# Patient Record
Sex: MALE | Race: WHITE | NOT HISPANIC OR LATINO | Employment: FULL TIME | URBAN - METROPOLITAN AREA
[De-identification: names, ages, dates, MRNs, and addresses within clinical notes are randomized per-mention and may not be internally consistent; named-entity substitution may affect disease eponyms.]

---

## 2018-04-06 PROCEDURE — 93005 ELECTROCARDIOGRAM TRACING: CPT

## 2018-04-07 ENCOUNTER — APPOINTMENT (EMERGENCY)
Dept: CT IMAGING | Facility: HOSPITAL | Age: 43
DRG: 392 | End: 2018-04-07
Payer: COMMERCIAL

## 2018-04-07 ENCOUNTER — HOSPITAL ENCOUNTER (INPATIENT)
Facility: HOSPITAL | Age: 43
LOS: 1 days | Discharge: HOME | DRG: 392 | End: 2018-04-08
Attending: EMERGENCY MEDICINE | Admitting: SURGERY
Payer: COMMERCIAL

## 2018-04-07 DIAGNOSIS — K56.600 PARTIAL SMALL BOWEL OBSTRUCTION (HCC): Primary | ICD-10-CM

## 2018-04-07 PROBLEM — K52.9 ENTEROCOLITIS: Status: ACTIVE | Noted: 2018-04-07

## 2018-04-07 LAB
ALBUMIN SERPL BCP-MCNC: 4.6 G/DL (ref 3.5–5)
ALP SERPL-CCNC: 95 U/L (ref 46–116)
ALT SERPL W P-5'-P-CCNC: 107 U/L (ref 12–78)
ANION GAP SERPL CALCULATED.3IONS-SCNC: 9 MMOL/L (ref 4–13)
AST SERPL W P-5'-P-CCNC: 40 U/L (ref 5–45)
ATRIAL RATE: 130 BPM
BACTERIA UR QL AUTO: NORMAL /HPF
BASOPHILS # BLD AUTO: 0.03 THOUSANDS/ΜL (ref 0–0.1)
BASOPHILS NFR BLD AUTO: 0 % (ref 0–1)
BILIRUB SERPL-MCNC: 0.8 MG/DL (ref 0.2–1)
BILIRUB UR QL STRIP: NEGATIVE
BUN SERPL-MCNC: 16 MG/DL (ref 5–25)
CALCIUM SERPL-MCNC: 10.1 MG/DL (ref 8.3–10.1)
CHLORIDE SERPL-SCNC: 103 MMOL/L (ref 100–108)
CLARITY UR: CLEAR
CO2 SERPL-SCNC: 30 MMOL/L (ref 21–32)
COLOR UR: YELLOW
CREAT SERPL-MCNC: 1.41 MG/DL (ref 0.6–1.3)
EOSINOPHIL # BLD AUTO: 0.08 THOUSAND/ΜL (ref 0–0.61)
EOSINOPHIL NFR BLD AUTO: 1 % (ref 0–6)
ERYTHROCYTE [DISTWIDTH] IN BLOOD BY AUTOMATED COUNT: 12.9 % (ref 11.6–15.1)
GFR SERPL CREATININE-BSD FRML MDRD: 61 ML/MIN/1.73SQ M
GLUCOSE SERPL-MCNC: 126 MG/DL (ref 65–140)
GLUCOSE UR STRIP-MCNC: NEGATIVE MG/DL
HCT VFR BLD AUTO: 49.5 % (ref 36.5–49.3)
HGB BLD-MCNC: 17.2 G/DL (ref 12–17)
HGB UR QL STRIP.AUTO: NEGATIVE
KETONES UR STRIP-MCNC: NEGATIVE MG/DL
LEUKOCYTE ESTERASE UR QL STRIP: NEGATIVE
LIPASE SERPL-CCNC: 114 U/L (ref 73–393)
LYMPHOCYTES # BLD AUTO: 1.21 THOUSANDS/ΜL (ref 0.6–4.47)
LYMPHOCYTES NFR BLD AUTO: 11 % (ref 14–44)
MAGNESIUM SERPL-MCNC: 2.3 MG/DL (ref 1.6–2.6)
MCH RBC QN AUTO: 29.9 PG (ref 26.8–34.3)
MCHC RBC AUTO-ENTMCNC: 34.7 G/DL (ref 31.4–37.4)
MCV RBC AUTO: 86 FL (ref 82–98)
MONOCYTES # BLD AUTO: 0.65 THOUSAND/ΜL (ref 0.17–1.22)
MONOCYTES NFR BLD AUTO: 6 % (ref 4–12)
MUCOUS THREADS UR QL AUTO: NORMAL
NEUTROPHILS # BLD AUTO: 9.25 THOUSANDS/ΜL (ref 1.85–7.62)
NEUTS SEG NFR BLD AUTO: 82 % (ref 43–75)
NITRITE UR QL STRIP: NEGATIVE
NON-SQ EPI CELLS URNS QL MICRO: NORMAL /HPF
P AXIS: 37 DEGREES
PH UR STRIP.AUTO: 6.5 [PH] (ref 4.5–8)
PLATELET # BLD AUTO: 192 THOUSANDS/UL (ref 149–390)
PMV BLD AUTO: 11 FL (ref 8.9–12.7)
POTASSIUM SERPL-SCNC: 4 MMOL/L (ref 3.5–5.3)
PR INTERVAL: 164 MS
PROT SERPL-MCNC: 8 G/DL (ref 6.4–8.2)
PROT UR STRIP-MCNC: ABNORMAL MG/DL
QRS AXIS: 32 DEGREES
QRSD INTERVAL: 92 MS
QT INTERVAL: 304 MS
QTC INTERVAL: 439 MS
RBC # BLD AUTO: 5.76 MILLION/UL (ref 3.88–5.62)
RBC #/AREA URNS AUTO: NORMAL /HPF
SODIUM SERPL-SCNC: 142 MMOL/L (ref 136–145)
SP GR UR STRIP.AUTO: 1.02 (ref 1–1.03)
T WAVE AXIS: 16 DEGREES
UROBILINOGEN UR QL STRIP.AUTO: 0.2 E.U./DL
VENTRICULAR RATE: 125 BPM
WBC # BLD AUTO: 11.22 THOUSAND/UL (ref 4.31–10.16)
WBC #/AREA URNS AUTO: NORMAL /HPF

## 2018-04-07 PROCEDURE — 83735 ASSAY OF MAGNESIUM: CPT | Performed by: EMERGENCY MEDICINE

## 2018-04-07 PROCEDURE — 96374 THER/PROPH/DIAG INJ IV PUSH: CPT

## 2018-04-07 PROCEDURE — 36415 COLL VENOUS BLD VENIPUNCTURE: CPT | Performed by: EMERGENCY MEDICINE

## 2018-04-07 PROCEDURE — 85025 COMPLETE CBC W/AUTO DIFF WBC: CPT | Performed by: EMERGENCY MEDICINE

## 2018-04-07 PROCEDURE — 81001 URINALYSIS AUTO W/SCOPE: CPT | Performed by: EMERGENCY MEDICINE

## 2018-04-07 PROCEDURE — 80053 COMPREHEN METABOLIC PANEL: CPT | Performed by: EMERGENCY MEDICINE

## 2018-04-07 PROCEDURE — 99253 IP/OBS CNSLTJ NEW/EST LOW 45: CPT | Performed by: INTERNAL MEDICINE

## 2018-04-07 PROCEDURE — 83690 ASSAY OF LIPASE: CPT | Performed by: EMERGENCY MEDICINE

## 2018-04-07 PROCEDURE — 99285 EMERGENCY DEPT VISIT HI MDM: CPT

## 2018-04-07 PROCEDURE — 93010 ELECTROCARDIOGRAM REPORT: CPT | Performed by: INTERNAL MEDICINE

## 2018-04-07 PROCEDURE — C9113 INJ PANTOPRAZOLE SODIUM, VIA: HCPCS | Performed by: SURGERY

## 2018-04-07 PROCEDURE — 87631 RESP VIRUS 3-5 TARGETS: CPT | Performed by: SURGERY

## 2018-04-07 PROCEDURE — 74176 CT ABD & PELVIS W/O CONTRAST: CPT

## 2018-04-07 PROCEDURE — 96361 HYDRATE IV INFUSION ADD-ON: CPT

## 2018-04-07 RX ORDER — PANTOPRAZOLE SODIUM 40 MG/1
40 INJECTION, POWDER, FOR SOLUTION INTRAVENOUS
Status: DISCONTINUED | OUTPATIENT
Start: 2018-04-07 | End: 2018-04-08 | Stop reason: HOSPADM

## 2018-04-07 RX ORDER — ONDANSETRON 2 MG/ML
4 INJECTION INTRAMUSCULAR; INTRAVENOUS ONCE
Status: COMPLETED | OUTPATIENT
Start: 2018-04-07 | End: 2018-04-07

## 2018-04-07 RX ORDER — HYDRALAZINE HYDROCHLORIDE 20 MG/ML
5 INJECTION INTRAMUSCULAR; INTRAVENOUS EVERY 4 HOURS PRN
Status: DISCONTINUED | OUTPATIENT
Start: 2018-04-07 | End: 2018-04-08 | Stop reason: HOSPADM

## 2018-04-07 RX ORDER — ACETAMINOPHEN 325 MG/1
650 TABLET ORAL EVERY 6 HOURS PRN
Status: DISCONTINUED | OUTPATIENT
Start: 2018-04-07 | End: 2018-04-08 | Stop reason: HOSPADM

## 2018-04-07 RX ORDER — DEXTROSE, SODIUM CHLORIDE, AND POTASSIUM CHLORIDE 5; .45; .15 G/100ML; G/100ML; G/100ML
125 INJECTION INTRAVENOUS CONTINUOUS
Status: DISCONTINUED | OUTPATIENT
Start: 2018-04-07 | End: 2018-04-08 | Stop reason: HOSPADM

## 2018-04-07 RX ORDER — ONDANSETRON 2 MG/ML
4 INJECTION INTRAMUSCULAR; INTRAVENOUS EVERY 4 HOURS PRN
Status: DISCONTINUED | OUTPATIENT
Start: 2018-04-07 | End: 2018-04-08 | Stop reason: HOSPADM

## 2018-04-07 RX ORDER — MONTELUKAST SODIUM 10 MG/1
10 TABLET ORAL
Status: DISCONTINUED | OUTPATIENT
Start: 2018-04-07 | End: 2018-04-08 | Stop reason: HOSPADM

## 2018-04-07 RX ORDER — ALBUTEROL SULFATE 90 UG/1
1-2 AEROSOL, METERED RESPIRATORY (INHALATION)
COMMUNITY

## 2018-04-07 RX ADMIN — PANTOPRAZOLE SODIUM 40 MG: 40 INJECTION, POWDER, FOR SOLUTION INTRAVENOUS at 08:17

## 2018-04-07 RX ADMIN — DEXTROSE, SODIUM CHLORIDE, AND POTASSIUM CHLORIDE 125 ML/HR: 5; .45; .15 INJECTION INTRAVENOUS at 05:48

## 2018-04-07 RX ADMIN — DEXTROSE, SODIUM CHLORIDE, AND POTASSIUM CHLORIDE 125 ML/HR: 5; .45; .15 INJECTION INTRAVENOUS at 22:04

## 2018-04-07 RX ADMIN — SODIUM CHLORIDE 1000 ML: 0.9 INJECTION, SOLUTION INTRAVENOUS at 00:34

## 2018-04-07 RX ADMIN — ONDANSETRON 4 MG: 2 INJECTION INTRAMUSCULAR; INTRAVENOUS at 00:31

## 2018-04-07 RX ADMIN — DEXTROSE, SODIUM CHLORIDE, AND POTASSIUM CHLORIDE 125 ML/HR: 5; .45; .15 INJECTION INTRAVENOUS at 14:40

## 2018-04-07 RX ADMIN — ENOXAPARIN SODIUM 40 MG: 40 INJECTION SUBCUTANEOUS at 08:16

## 2018-04-07 RX ADMIN — SODIUM CHLORIDE 1000 ML: 0.9 INJECTION, SOLUTION INTRAVENOUS at 03:47

## 2018-04-07 RX ADMIN — MONTELUKAST SODIUM 10 MG: 10 TABLET, COATED ORAL at 22:04

## 2018-04-07 RX ADMIN — ACETAMINOPHEN 650 MG: 325 TABLET, FILM COATED ORAL at 16:05

## 2018-04-07 NOTE — PLAN OF CARE
DISCHARGE PLANNING     Discharge to home or other facility with appropriate resources Progressing        GASTROINTESTINAL - ADULT     Minimal or absence of nausea and/or vomiting Progressing     Maintains or returns to baseline bowel function Progressing     Maintains adequate nutritional intake Progressing        INFECTION - ADULT     Absence or prevention of progression during hospitalization Progressing        Knowledge Deficit     Patient/family/caregiver demonstrates understanding of disease process, treatment plan, medications, and discharge instructions Progressing        PAIN - ADULT     Verbalizes/displays adequate comfort level or baseline comfort level Progressing

## 2018-04-07 NOTE — CASE MANAGEMENT
Initial Clinical Review    Admission: Date/Time/Statement: 4/7/18 @ 0331     Orders Placed This Encounter   Procedures    Inpatient Admission (expected length of stay for this patient is greater than two midnights)     Standing Status:   Standing     Number of Occurrences:   1     Order Specific Question:   Admitting Physician     Answer:   Randi Bose [[id number]]     Order Specific Question:   Level of Care     Answer:   Med Surg [[id number]]     Order Specific Question:   Estimated length of stay     Answer:   More than 2 Midnights     Order Specific Question:   Certification     Answer:   I certify that inpatient services are medically necessary for this patient for a duration of greater than two midnights  See H&P and MD Progress Notes for additional information about the patient's course of treatment  ED: Date/Time/Mode of Arrival:   ED Arrival Information     Expected Arrival Acuity Means of Arrival Escorted By Service Admission Type    - 4/6/2018 22:14 Emergent Walk-In Self General Medicine Emergency    Arrival Complaint    stomach pain          Chief Complaint:   Chief Complaint   Patient presents with    Abdominal Pain     C/o of generalized abd pain onset this afternoon followed by nausea and dry heaving  Denies diarrhea  History of Illness: This 68-year-old male presents today with lower abdominal pain  Patient states symptoms began earlier this evening  Patient describes it as a dull aching pain across the entire lower portion of his abdomen with periodic exacerbations of ache more cramping type severe pain  Patient denies history of similar symptoms in the past   Patient's last bowel movement was at 6:00 p m , during the pain  Patient states that this did not make the pain any better or worse  Patient denies any fevers or chills  Patient does relay episode of vomiting and several episodes of dry heaving  Patient denies any recent sick contacts, travel, unusual food intake    Patient does not take any medications regularly and only past medical history is asthma  Patient denies any urinary complaints  ED Vital Signs:   ED Triage Vitals [04/06/18 2317]   Temperature Pulse Respirations Blood Pressure SpO2   98 6 °F (37 °C) (!) 120 18 (!) 180/110 94 %      Temp Source Heart Rate Source Patient Position - Orthostatic VS BP Location FiO2 (%)   Oral Monitor Sitting Right arm --      Pain Score       6        Wt Readings from Last 1 Encounters:   04/07/18 118 kg (260 lb 2 3 oz)       Vital Signs (abnormal):   Date and Time Temp Pulse SpO2 Resp BP   04/07/18 0400 -- 100 96 % 18 137/92   04/07/18 0305 --  106 96 % 18  168/105   04/07/18 0143 --  113 94 % 18  161/105   04/07/18 0032 --  120 94 % 18  162/108       Abnormal Labs/Diagnostic Test Results: Creat 1 41, , WBC 11 22, H/H 17 2/49 5, Neutros PCT 82    CT of Abd/Pelvis:   Impression:    1   Mild abnormal appearance of the small and large bowel, suggesting mild enterocolitis, either infectious or inflammatory  2   Hiatal hernia with thickening of the distal esophagus, likely related to reflux esophagitis   Consider follow-up upper endoscopy  3   Mild abnormal appearance of the duodenum, most compatible with duodenitis   Consider follow-up upper endoscopy  4   Mild submucosal fat replacement involving the small and large bowel, likely related to either obesity or chronic inflammatory bowel disease           EKG: Sinus tachycardia     ED Treatment:   Medication Administration from 04/06/2018 2214 to 04/07/2018 0442       Date/Time Order Dose Route Action Action by Comments     04/07/2018 0031 ondansetron (ZOFRAN) injection 4 mg 4 mg Intravenous Given Roc Ambriz RN      04/07/2018 0143 sodium chloride 0 9 % bolus 1,000 mL 0 mL Intravenous Stopped Roc Ambriz RN      04/07/2018 0034 sodium chloride 0 9 % bolus 1,000 mL 1,000 mL Intravenous [address] Roc Ambriz RN      04/07/2018 0347 sodium chloride 0 9 % bolus 1,000 mL 1,000 mL Intravenous New Bag Goyo Bowie RN         Physical Exam   Constitutional: He is oriented to person, place, and time  He appears well-developed and well-nourished  He is cooperative  No distress  Cardiovascular: Regular rhythm, normal heart sounds and normal pulses  Tachycardia present  No murmur heard  Pulses:       Dorsalis pedis pulses are 2+ on the right side, and 2+ on the left side  Pulmonary/Chest: Effort normal and breath sounds normal  He exhibits no tenderness  Abdominal: Soft  He exhibits no distension  There is tenderness in the right lower quadrant, suprapubic area and left lower quadrant  There is no rebound and no guarding  Neurological: He is alert and oriented to person, place, and time  He has normal strength  No cranial nerve deficit or sensory deficit  Coordination normal  GCS eye subscore is 4  GCS verbal subscore is 5  GCS motor subscore is 6  Past Medical/Surgical History:    Active Ambulatory Problems     Diagnosis Date Noted    No Active Ambulatory Problems     Resolved Ambulatory Problems     Diagnosis Date Noted    No Resolved Ambulatory Problems     Past Medical History:   Diagnosis Date    Asthma        Admitting Diagnosis: Stomach pain [R10 9]  Partial small bowel obstruction ([facility] [address] ) [K56 600]    Age/Sex: 41 y o  male    Assessment/Plan:     29-year-old male with possible enterocolitis  -will allow clears for breakfast  -will see how patient tolerates clears  -DVT prophylaxis  -nausea and pain control     Tachycardia  -currently heart rate of 92  -continue IV fluids     Admission Orders:  Inpatient/MedSurg  Consult Internal Medicine r/e partial bowel obstruction   Bilateral Sequential Compression Device  Clear Liquids  D5%NSS 0 45% + KCL 20 mEq @ 125    Scheduled Meds:   Current Facility-Administered Medications:  enoxaparin 40 mg Subcutaneous Q24H [facility]   influenza vaccine 0 5 mL Intramuscular Prior to discharge   montelukast 10 mg Oral HS   pantoprazole 40 mg Intravenous Q24H [address]

## 2018-04-07 NOTE — H&P
History and Physical -General Surgery  Orly Ortiz 41 y o  male MRN: [id number]  Unit/Bed#: -01 Encounter: [id number]        Reason for Consult / Principal Problem:  Abdominal pain  HPI: Orly Ortiz is a 41y o  year old male with no significant past medical history who presents with abdominal pain since last evening  Patient states around 6:00 p m  he began having diffuse abdominal pain that got worse overnight  States he ate dinner and vomited after  Did have a bowel movement last evening  Denies any abdominal surgical history  Was tachy overnight with max heart rate recorded at 120  Currently 92  CT abdomen pelvis revealed mild control colitis of small and large bowel, hiatal hernia,duodenitis  Currently states his abdominal pain is better  Denies any nausea or vomiting overnight  Review of Systems   Constitutional: Positive for appetite change  Negative for chills and fever  HENT: Negative for congestion and sore throat  Gastrointestinal: Positive for abdominal pain, nausea and vomiting  Negative for abdominal distention, constipation and diarrhea  Skin: Negative for rash and wound  Hematological: Does not bruise/bleed easily  Psychiatric/Behavioral: Negative for behavioral problems, confusion and decreased concentration  Historical Information   Past Medical History:   Diagnosis Date    Asthma      Past Surgical History:   Procedure Laterality Date    THUMB AMPUTATION Left 09/2015     Social History   History   Alcohol Use    Yes     Comment: occasionally     History   Drug Use No     History   Smoking Status    Never Smoker   Smokeless Tobacco    Never Used     History reviewed  No pertinent family history      Meds/Allergies     Prescriptions Prior to Admission   Medication    montelukast (SINGULAIR) 10 mg tablet    albuterol (VENTOLIN HFA) 90 mcg/act inhaler    Mometasone Furo-Formoterol Fum (DULERA) 200-5 MCG/ACT AERO     Current Facility-Administered Medications   Medication Dose Route Frequency    dextrose 5 % and sodium chloride 0 45 % with KCl 20 mEq/L infusion  125 mL/hr Intravenous Continuous    enoxaparin (LOVENOX) subcutaneous injection 40 mg  40 mg Subcutaneous Q24H JOANNE    hydrALAZINE (APRESOLINE) injection 5 mg  5 mg Intravenous Q4H PRN    HYDROmorphone (DILAUDID) injection 0 5 mg  0 5 mg Intravenous Q1H PRN    influenza inactivated quadrivalent vaccine (FLULAVAL) IM injection 0 5 mL  0 5 mL Intramuscular Prior to discharge    montelukast (SINGULAIR) tablet 10 mg  10 mg Oral HS    ondansetron (ZOFRAN) injection 4 mg  4 mg Intravenous Q4H PRN    pantoprazole (PROTONIX) injection 40 mg  40 mg Intravenous Q24H JOANNE       No Known Allergies    Objective     Blood pressure 144/99, pulse 93, temperature 97 8 °F (36 6 °C), temperature source Oral, resp  rate 18, height 6' (1 829 m), weight 118 kg (260 lb 2 3 oz), SpO2 94 %  Intake/Output Summary (Last 24 hours) at 04/07/18 3506  Last data filed at 04/07/18 0143   Gross per 24 hour   Intake             1000 ml   Output                0 ml   Net             1000 ml       PHYSICAL EXAM  General appearance: alert and oriented, in no acute distress  Skin: Skin color, texture, turgor normal  No rashes or lesions  Heart: regular rate and rhythm, S1, S2 normal, no murmur, click, rub or gallop  Lungs: clear to auscultation bilaterally  Abdomen: Soft, bowel sounds positive  Some tenderness to lower abdomen  No peritoneal signs    Neurological:  Speech normal, alert and oriented x3    Lab Results:   Admission on 04/07/2018   Component Date Value    WBC 04/07/2018 11 22*    RBC 04/07/2018 5 76*    Hemoglobin 04/07/2018 17 2*    Hematocrit 04/07/2018 49 5*    MCV 04/07/2018 86     MCH 04/07/2018 29 9     MCHC 04/07/2018 34 7     RDW 04/07/2018 12 9     MPV 04/07/2018 11 0     Platelets 83/21/5207 192     Neutrophils Relative 04/07/2018 82*    Lymphocytes Relative 04/07/2018 11*    Monocytes Relative 04/07/2018 6     Eosinophils Relative 04/07/2018 1     Basophils Relative 04/07/2018 0     Neutrophils Absolute 04/07/2018 9 25*    Lymphocytes Absolute 04/07/2018 1 21     Monocytes Absolute 04/07/2018 0 65     Eosinophils Absolute 04/07/2018 0 08     Basophils Absolute 04/07/2018 0 03     Sodium 04/07/2018 142     Potassium 04/07/2018 4 0     Chloride 04/07/2018 103     CO2 04/07/2018 30     Anion Gap 04/07/2018 9     BUN 04/07/2018 16     Creatinine 04/07/2018 1 41*    Glucose 04/07/2018 126     Calcium 04/07/2018 10 1     AST 04/07/2018 40     ALT 04/07/2018 107*    Alkaline Phosphatase 04/07/2018 95     Total Protein 04/07/2018 8 0     Albumin 04/07/2018 4 6     Total Bilirubin 04/07/2018 0 80     eGFR 04/07/2018 61     Lipase 04/07/2018 114     Magnesium 04/07/2018 2 3      Imaging Studies: I have personally reviewed pertinent reports  CT abd/pelvis w/o IV contrast 4/7/18:     CT ABDOMEN AND PELVIS WITHOUT IV CONTRAST     INDICATION:   lower abd pain, vomiting, tachycardia  Lower abdominal pain  Nausea and vomiting      COMPARISON: CT chest abdomen pelvis January 8, 2008     TECHNIQUE:  CT examination of the abdomen and pelvis was performed without intravenous contrast   Axial, sagittal, and coronal 2D reformatted images were created from the source data and submitted for interpretation       Radiation dose length product (DLP) for this visit:  6256 mGy-cm   This examination, like all CT scans performed in the [facility], was performed utilizing techniques to minimize radiation dose exposure, including the use of iterative   reconstruction and automated exposure control       Enteric contrast was not administered       FINDINGS:     ABDOMEN     LOWER CHEST:  No clinically significant abnormality identified in the visualized lower chest      LIVER/BILIARY TREE:  Enlarged with fatty infiltrative changes      GALLBLADDER:  No calcified gallstones   No pericholecystic inflammatory change      SPLEEN:  Unremarkable      PANCREAS:  Unremarkable      ADRENAL GLANDS:  Unremarkable      KIDNEYS/URETERS:  Unremarkable  No hydronephrosis      STOMACH AND BOWEL:    Evaluation limited due to lack of oral and intravenous contrast material      Stomach mildly distended and filled with recently ingested food products  There is a hiatal hernia  Thickening of the distal esophagus      The 3rd and 4th portions of the duodenum are mildly thickened and fluid-filled  Proximal small bowel mildly dilated and fluid-filled  Mid small bowel relatively decompressed  There is mild amount of submucosal fat in the mid and distal small bowel  The terminal ileum is distended and fluid-filled      The colon is mildly distended and filled with liquid feces  Scattered diverticula in the descending colon and sigmoid colon      APPENDIX:  No findings to suggest appendicitis      ABDOMINOPELVIC CAVITY:  No ascites or free intraperitoneal air  Mild amount of fluid in the small bowel mesentery  Small bowel mesentery is mildly edematous  No lymphadenopathy      VESSELS:  Unremarkable for patient's age      PELVIS     REPRODUCTIVE ORGANS:  Unremarkable for patient's age      URINARY BLADDER:  Unremarkable      ABDOMINAL WALL/INGUINAL REGIONS:  Unremarkable      OSSEOUS STRUCTURES:  No acute fracture or destructive osseous lesion      IMPRESSION:  1  Mild abnormal appearance of the small and large bowel, suggesting mild enterocolitis, either infectious or inflammatory  2   Hiatal hernia with thickening of the distal esophagus, likely related to reflux esophagitis  Consider follow-up upper endoscopy  3   Mild abnormal appearance of the duodenum, most compatible with duodenitis  Consider follow-up upper endoscopy    4   Mild submucosal fat replacement involving the small and large bowel, likely related to either obesity or chronic inflammatory bowel disease      The major findings are in agreement with the preliminary report provided by [facility] which was provided shortly after completion of the exam  The additional finding of  abnormal appearance of the esophagus and duodenum  Consider follow-up   upper endoscopy  The findings will now be communicated with patient's clinical team by our radiology liaison  ASSESSMENT/PLAN:    61-year-old male with possible enterocolitis  -will allow clears for breakfast  -will see how patient tolerates clears  -DVT prophylaxis  -nausea and pain control    Tachycardia  -currently heart rate of 92  -continue IV fluids        Counseling / Coordination of Care  Total time spent today  20 minutes  Greater than 50% of total time was spent with the patient and / or family counseling and / or coordination of care       Frank Hall PA-C

## 2018-04-07 NOTE — ED PROVIDER NOTES
History  Chief Complaint   Patient presents with    Abdominal Pain     C/o of generalized abd pain onset this afternoon followed by nausea and dry heaving  Denies diarrhea  This 61-year-old male presents today with lower abdominal pain  Patient states symptoms began earlier this evening  Patient describes it as a dull aching pain across the entire lower portion of his abdomen with periodic exacerbations of ache more cramping type severe pain  Patient denies history of similar symptoms in the past   Patient's last bowel movement was at 6:00 p m , during the pain  Patient states that this did not make the pain any better or worse  Patient denies any fevers or chills  Patient does relay episode of vomiting and several episodes of dry heaving  Patient denies any recent sick contacts, travel, unusual food intake  Patient does not take any medications regularly and only past medical history is asthma  Patient denies any urinary complaints  History provided by:  Patient   used: No    Abdominal Pain   Pain location:  LLQ, RLQ and suprapubic  Pain quality: aching and cramping    Pain radiates to:  Does not radiate  Pain severity:  Mild  Onset quality:  Gradual  Duration:  8 hours  Timing:  Constant  Progression:  Waxing and waning  Chronicity:  New  Context: not previous surgeries, not recent travel, not sick contacts and not suspicious food intake    Relieved by:  None tried  Worsened by:  Eating  Ineffective treatments:  None tried  Associated symptoms: nausea and vomiting    Associated symptoms: no chest pain, no constipation, no cough, no diarrhea, no dysuria, no fever, no hematuria, no shortness of breath and no sore throat        Prior to Admission Medications   Prescriptions Last Dose Informant Patient Reported? Taking? azithromycin (ZITHROMAX Z-SHAHIDA) 250 mg tablet   No Yes   Sig: Take 1 tablet (250 mg total) by mouth daily   Take two tabs on day one and then one tab each day for 4 days   montelukast (SINGULAIR) 10 mg tablet   Yes Yes   Sig: Take 10 mg by mouth daily at bedtime  Facility-Administered Medications: None       Past Medical History:   Diagnosis Date    Asthma        History reviewed  No pertinent surgical history  History reviewed  No pertinent family history  I have reviewed and agree with the history as documented  Social History   Substance Use Topics    Smoking status: Never Smoker    Smokeless tobacco: Never Used    Alcohol use Yes      Comment: occasionally        Review of Systems   Constitutional: Negative for activity change, appetite change, diaphoresis and fever  HENT: Negative for ear pain, facial swelling, sore throat, tinnitus and voice change  Eyes: Negative for photophobia, pain and redness  Respiratory: Negative for cough, chest tightness, shortness of breath and wheezing  Cardiovascular: Negative for chest pain, palpitations and leg swelling  Gastrointestinal: Positive for abdominal pain, nausea and vomiting  Negative for abdominal distention, constipation and diarrhea  Genitourinary: Negative for difficulty urinating, dysuria, flank pain, hematuria and urgency  Musculoskeletal: Negative for back pain, gait problem and neck pain  Skin: Negative for rash and wound  Neurological: Negative for dizziness, syncope, speech difficulty, weakness and headaches  Psychiatric/Behavioral: Negative for agitation, behavioral problems and confusion         Physical Exam  ED Triage Vitals [04/06/18 2317]   Temperature Pulse Respirations Blood Pressure SpO2   98 6 °F (37 °C) (!) 120 18 (!) 180/110 94 %      Temp Source Heart Rate Source Patient Position - Orthostatic VS BP Location FiO2 (%)   Oral Monitor Sitting Right arm --      Pain Score       6           Orthostatic Vital Signs  Vitals:    04/06/18 2317 04/07/18 0032 04/07/18 0143 04/07/18 0305   BP: (!) 180/110 (!) 162/108 (!) 161/105 (!) 168/105   Pulse: (!) 120 (!) 120 (!) 113 (!) 106   Patient Position - Orthostatic VS: Sitting Lying Lying Lying       Physical Exam   Constitutional: He is oriented to person, place, and time  He appears well-developed and well-nourished  He is cooperative  No distress  HENT:   Head: Normocephalic and atraumatic  Mouth/Throat: Oropharynx is clear and moist    Eyes: EOM and lids are normal  Pupils are equal, round, and reactive to light  Right eye exhibits no discharge  Left eye exhibits no discharge  Right conjunctiva is not injected  Left conjunctiva is not injected  Neck: Trachea normal, normal range of motion, full passive range of motion without pain and phonation normal  Neck supple  Cardiovascular: Regular rhythm, normal heart sounds and normal pulses  Tachycardia present  No murmur heard  Pulses:       Dorsalis pedis pulses are 2+ on the right side, and 2+ on the left side  Pulmonary/Chest: Effort normal and breath sounds normal  He exhibits no tenderness  Abdominal: Soft  He exhibits no distension  There is tenderness in the right lower quadrant, suprapubic area and left lower quadrant  There is no rebound and no guarding  Musculoskeletal: Normal range of motion  He exhibits no edema  Neurological: He is alert and oriented to person, place, and time  He has normal strength  No cranial nerve deficit or sensory deficit  Coordination normal  GCS eye subscore is 4  GCS verbal subscore is 5  GCS motor subscore is 6  Skin: Skin is warm, dry and intact  Capillary refill takes less than 2 seconds  No rash noted  Psychiatric: He has a normal mood and affect  His speech is normal and behavior is normal  Thought content normal    Vitals reviewed        ED Medications  Medications   sodium chloride 0 9 % bolus 1,000 mL (not administered)   ondansetron (ZOFRAN) injection 4 mg (4 mg Intravenous Given 4/7/18 0031)   sodium chloride 0 9 % bolus 1,000 mL (0 mL Intravenous Stopped 4/7/18 0143)       Diagnostic Studies  Results Reviewed Procedure Component Value Units Date/Time    CMP [[id number]]  (Abnormal) Collected:  04/07/18 0033    Lab Status:  Final result Specimen:  Blood from Arm, Left Updated:  04/07/18 0059     Sodium 142 mmol/L      Potassium 4 0 mmol/L      Chloride 103 mmol/L      CO2 30 mmol/L      Anion Gap 9 mmol/L      BUN 16 mg/dL      Creatinine 1 41 (H) mg/dL      Glucose 126 mg/dL      Calcium 10 1 mg/dL      AST 40 U/L       (H) U/L      Alkaline Phosphatase 95 U/L      Total Protein 8 0 g/dL      Albumin 4 6 g/dL      Total Bilirubin 0 80 mg/dL      eGFR 61 ml/min/1 73sq m     Narrative:         National Kidney Disease Education Program recommendations are as follows:  GFR calculation is accurate only with a steady state creatinine  Chronic Kidney disease less than 60 ml/min/1 73 sq  meters  Kidney failure less than 15 ml/min/1 73 sq  meters      Lipase [[id number]]  (Normal) Collected:  04/07/18 0033    Lab Status:  Final result Specimen:  Blood from Arm, Left Updated:  04/07/18 0059     Lipase 114 u/L     Magnesium [[id number]]  (Normal) Collected:  04/07/18 0033    Lab Status:  Final result Specimen:  Blood from Arm, Left Updated:  04/07/18 0059     Magnesium 2 3 mg/dL     CBC and differential [[id number]]  (Abnormal) Collected:  04/07/18 0033    Lab Status:  Final result Specimen:  Blood from Arm, Left Updated:  04/07/18 0042     WBC 11 22 (H) Thousand/uL      RBC 5 76 (H) Million/uL      Hemoglobin 17 2 (H) g/dL      Hematocrit 49 5 (H) %      MCV 86 fL      MCH 29 9 pg      MCHC 34 7 g/dL      RDW 12 9 %      MPV 11 0 fL      Platelets 806 Thousands/uL      Neutrophils Relative 82 (H) %      Lymphocytes Relative 11 (L) %      Monocytes Relative 6 %      Eosinophils Relative 1 %      Basophils Relative 0 %      Neutrophils Absolute 9 25 (H) Thousands/µL      Lymphocytes Absolute 1 21 Thousands/µL      Monocytes Absolute 0 65 Thousand/µL      Eosinophils Absolute 0 08 Thousand/µL      Basophils Absolute 0 03 Thousands/µL UA w Reflex to Microscopic w Reflex to Culture [[id number]]     Lab Status:  No result Specimen:  Urine                  CT abdomen pelvis wo contrast    (Results Pending)              Procedures  Procedures       Phone Contacts  ED Phone Contact    ED Course  ED Course                                MDM  Number of Diagnoses or Management Options  Partial small bowel obstruction [facility]):   Diagnosis management comments: After IV fluids patient continues to be tachycardic with a heart rate approximately 106  CT scan demonstrates evidence of a partial small bowel obstruction  Discussed case with General surgery, Dr Kam Bhatti who has accepted patient in admission  Amount and/or Complexity of Data Reviewed  Clinical lab tests: ordered and reviewed  Tests in the radiology section of CPT®: reviewed and ordered  Tests in the medicine section of CPT®: ordered and reviewed  Discuss the patient with other providers: yes    Risk of Complications, Morbidity, and/or Mortality  Presenting problems: high  Diagnostic procedures: high  Management options: high    Patient Progress  Patient progress: stable    CritCare Time    Disposition  Final diagnoses:   Partial small bowel obstruction ([address] )     Time reflects when diagnosis was documented in both MDM as applicable and the Disposition within this note     Time User Action Codes Description Comment    4/7/2018  3:31 AM Graciela Burleson Add [K56 430] Partial small bowel obstruction [facility])       ED Disposition     ED Disposition Condition Comment    Admit  Case was discussed with [facility] and the patient's admission status was agreed to be Admission Status: inpatient status to the service of Dr aKm Bhatti   Follow-up Information    None       Patient's Medications   Discharge Prescriptions    No medications on file     No discharge procedures on file      ED Provider  Electronically Signed by           Fahad Carreon MD  04/07/18 9825

## 2018-04-07 NOTE — CONSULTS
Inpatient Medical Consultation - [facility]    Patient Information: Sindy Ortiz 41 y o  male MRN: [id number]  Unit/Bed#: -01 Encounter: [id number]  PCP: Reed Dickerson MD  Date of Admission:  4/7/2018  Date of Consultation: 04/07/18  Requesting Physician: Ren Navarro MD    Reason For Consultation:   High blood pressure    Assessment/Plan:    · Elevated blood pressure:  Currently improved to 147/86, asymptomatic  Will continue to monitor  Continue hydralazine p r n  · Polycythemia:  Likely due to dehydration  Recheck after hydration  · History of asthma:  Stable without any exacerbation  · Tachycardia due to pain and dehydration:  No improved  · Possible enterocolitis:  Surgical follow-up ongoing    VTE Prophylaxis: Enoxaparin (Lovenox)  / sequential compression device     Recommendations for Discharge:  · Resume home medication    Counseling / Coordination of Care Time: 45 minutes  Greater than 50% of total time spent on patient counseling and coordination of care  Collaboration of Care: Were Recommendations Directly Discussed with Primary Treatment Team? - No     History of Present Illness: Sindy Ortiz is a 41 y o  male who is originally admitted to the surgical service on 4/7/2018 due to enterocolitis  We are consulted for elevated blood pressure  Patient currently feeling better without any headache, visual disturbances or chest pain  Denies any shortness of breath or palpitations  Abdominal pain improving    No fever chills or rigors    Review of Systems:    Review of Systems  Twelve point review systems negative except noted above  Past Medical and Surgical History:     Past Medical History:   Diagnosis Date    Asthma        Past Surgical History:   Procedure Laterality Date    THUMB AMPUTATION Left 09/2015       Meds/Allergies:    all medications and allergies reviewed    Allergies: No Known Allergies    Social History:     Marital Status: /Civil [place]    Substance Use History:   History   Alcohol Use    Yes     Comment: occasionally     History   Smoking Status    Never Smoker   Smokeless Tobacco    Never Used     History   Drug Use No       Family History:    non-contributory    Physical Exam:     Vitals:   Blood Pressure: 147/86 (04/07/18 0724)  Pulse: 87 (04/07/18 0724)  Temperature: 97 5 °F (36 4 °C) (04/07/18 0724)  Temp Source: Oral (04/07/18 0724)  Respirations: 18 (04/07/18 0724)  Height: 6' (182 9 cm) (04/07/18 0445)  Weight - Scale: 118 kg (260 lb 2 3 oz) (04/07/18 0347)  SpO2: 95 % (04/07/18 0724)    Physical Exam     Gen -Patient comfortable at rest  Neck- Supple  No thyromegaly or lymphadenopathy  Lungs-Clear bilaterally without any wheeze or rales   Heart S1-S2, regular rate and rhythm, no murmurs  Abdomen-distended, mildly tender, no organomegaly  Bowel sounds present  Extremities-no cyanosi,  clubbing or edema  Skin- no rash  Neuro-nonfocal         Additional Data:     Lab Results: I have personally reviewed pertinent reports  Results from last 7 days  Lab Units 04/07/18  0033   WBC Thousand/uL 11 22*   HEMOGLOBIN g/dL 17 2*   HEMATOCRIT % 49 5*   PLATELETS Thousands/uL 192   NEUTROS PCT % 82*   LYMPHS PCT % 11*   MONOS PCT % 6   EOS PCT % 1       Results from last 7 days  Lab Units 04/07/18  0033   SODIUM mmol/L 142   POTASSIUM mmol/L 4 0   CHLORIDE mmol/L 103   CO2 mmol/L 30   BUN mg/dL 16   CREATININE mg/dL 1 41*   CALCIUM mg/dL 10 1   TOTAL PROTEIN g/dL 8 0   BILIRUBIN TOTAL mg/dL 0 80   ALK PHOS U/L 95   ALT U/L 107*   AST U/L 40   GLUCOSE RANDOM mg/dL 126           Imaging: I have personally reviewed pertinent reports  Ct Abdomen Pelvis Wo Contrast    Result Date: 4/7/2018  Narrative: CT ABDOMEN AND PELVIS WITHOUT IV CONTRAST INDICATION:   lower abd pain, vomiting, tachycardia  Lower abdominal pain  Nausea and vomiting   COMPARISON: CT chest abdomen pelvis January 8, 2008 TECHNIQUE:  CT examination of the abdomen and pelvis was performed without intravenous contrast   Axial, sagittal, and coronal 2D reformatted images were created from the source data and submitted for interpretation  Radiation dose length product (DLP) for this visit:  1127 mGy-cm   This examination, like all CT scans performed in the [facility], was performed utilizing techniques to minimize radiation dose exposure, including the use of iterative reconstruction and automated exposure control  Enteric contrast was not administered  FINDINGS: ABDOMEN LOWER CHEST:  No clinically significant abnormality identified in the visualized lower chest  LIVER/BILIARY TREE:  Enlarged with fatty infiltrative changes  GALLBLADDER:  No calcified gallstones  No pericholecystic inflammatory change  SPLEEN:  Unremarkable  PANCREAS:  Unremarkable  ADRENAL GLANDS:  Unremarkable  KIDNEYS/URETERS:  Unremarkable  No hydronephrosis  STOMACH AND BOWEL:  Evaluation limited due to lack of oral and intravenous contrast material  Stomach mildly distended and filled with recently ingested food products  There is a hiatal hernia  Thickening of the distal esophagus  The 3rd and 4th portions of the duodenum are mildly thickened and fluid-filled  Proximal small bowel mildly dilated and fluid-filled  Mid small bowel relatively decompressed  There is mild amount of submucosal fat in the mid and distal small bowel  The terminal ileum is distended and fluid-filled  The colon is mildly distended and filled with liquid feces  Scattered diverticula in the descending colon and sigmoid colon  APPENDIX:  No findings to suggest appendicitis  ABDOMINOPELVIC CAVITY:  No ascites or free intraperitoneal air  Mild amount of fluid in the small bowel mesentery  Small bowel mesentery is mildly edematous  No lymphadenopathy  VESSELS:  Unremarkable for patient's age  PELVIS REPRODUCTIVE ORGANS:  Unremarkable for patient's age  URINARY BLADDER:  Unremarkable   ABDOMINAL WALL/INGUINAL REGIONS:  Unremarkable  OSSEOUS STRUCTURES:  No acute fracture or destructive osseous lesion  Impression: 1  Mild abnormal appearance of the small and large bowel, suggesting mild enterocolitis, either infectious or inflammatory  2   Hiatal hernia with thickening of the distal esophagus, likely related to reflux esophagitis  Consider follow-up upper endoscopy  3   Mild abnormal appearance of the duodenum, most compatible with duodenitis  Consider follow-up upper endoscopy  4   Mild submucosal fat replacement involving the small and large bowel, likely related to either obesity or chronic inflammatory bowel disease  The major findings are in agreement with the preliminary report provided by [facility] which was provided shortly after completion of the exam  The additional finding of  abnormal appearance of the esophagus and duodenum  Consider follow-up upper endoscopy  The findings will now be communicated with patient's clinical team by our radiology liaison  Workstation performed: [id number]       EKG, Pathology, and Other Studies Reviewed on Admission:   · EKG:  Sinus tachycardia    ** Please Note: This note has been constructed using a voice recognition system   **

## 2018-04-08 VITALS
TEMPERATURE: 97.4 F | SYSTOLIC BLOOD PRESSURE: 160 MMHG | RESPIRATION RATE: 18 BRPM | WEIGHT: 260.14 LBS | HEART RATE: 76 BPM | HEIGHT: 72 IN | OXYGEN SATURATION: 98 % | DIASTOLIC BLOOD PRESSURE: 80 MMHG | BODY MASS INDEX: 35.24 KG/M2

## 2018-04-08 LAB
ANION GAP SERPL CALCULATED.3IONS-SCNC: 6 MMOL/L (ref 4–13)
BUN SERPL-MCNC: 8 MG/DL (ref 5–25)
CALCIUM SERPL-MCNC: 8.2 MG/DL (ref 8.3–10.1)
CHLORIDE SERPL-SCNC: 106 MMOL/L (ref 100–108)
CO2 SERPL-SCNC: 28 MMOL/L (ref 21–32)
CREAT SERPL-MCNC: 1.12 MG/DL (ref 0.6–1.3)
ERYTHROCYTE [DISTWIDTH] IN BLOOD BY AUTOMATED COUNT: 13 % (ref 11.6–15.1)
FLUAV AG SPEC QL: NORMAL
FLUBV AG SPEC QL: NORMAL
GFR SERPL CREATININE-BSD FRML MDRD: 81 ML/MIN/1.73SQ M
GLUCOSE SERPL-MCNC: 103 MG/DL (ref 65–140)
HCT VFR BLD AUTO: 42.3 % (ref 36.5–49.3)
HGB BLD-MCNC: 14.1 G/DL (ref 12–17)
MCH RBC QN AUTO: 29.9 PG (ref 26.8–34.3)
MCHC RBC AUTO-ENTMCNC: 33.3 G/DL (ref 31.4–37.4)
MCV RBC AUTO: 90 FL (ref 82–98)
PLATELET # BLD AUTO: 149 THOUSANDS/UL (ref 149–390)
PMV BLD AUTO: 10.6 FL (ref 8.9–12.7)
POTASSIUM SERPL-SCNC: 3.6 MMOL/L (ref 3.5–5.3)
RBC # BLD AUTO: 4.71 MILLION/UL (ref 3.88–5.62)
RSV B RNA SPEC QL NAA+PROBE: NORMAL
SODIUM SERPL-SCNC: 140 MMOL/L (ref 136–145)
WBC # BLD AUTO: 5.34 THOUSAND/UL (ref 4.31–10.16)

## 2018-04-08 PROCEDURE — 80048 BASIC METABOLIC PNL TOTAL CA: CPT | Performed by: PHYSICIAN ASSISTANT

## 2018-04-08 PROCEDURE — 85027 COMPLETE CBC AUTOMATED: CPT | Performed by: PHYSICIAN ASSISTANT

## 2018-04-08 NOTE — DISCHARGE INSTRUCTIONS
[place] diet for a few days  Rice, bread, pasta, soups, etc   Stay hydrated    Please call Dr Brijesh Mclean if any difficulties  [phone]   [address], [place] [address]

## 2018-04-08 NOTE — PROGRESS NOTES
Progress Note -Surgery [facility] Marie Soheila Ortiz 41 y o  male MRN: [id number]  Unit/Bed#: -01 Encounter: [id number]      Subjective/Objective     Subjective:  Patient feels well this a m  Boo Devine Denies abdominal pain  No nausea or vomiting      Objective:     /80 (BP Location: Left arm)   Pulse 76   Temp (!) 97 4 °F (36 3 °C) (Oral)   Resp 18   Ht 6' (1 829 m)   Wt 118 kg (260 lb 2 3 oz)   SpO2 98%   BMI 35 28 kg/m²       Intake/Output Summary (Last 24 hours) at 04/08/18 0844  Last data filed at 04/08/18 0601   Gross per 24 hour   Intake          2278 75 ml   Output              650 ml   Net          1628 75 ml       Invasive Devices     Peripheral Intravenous Line            Peripheral IV 04/07/18 Left Antecubital 1 day                Physical Exam:  General appearance: alert and oriented, in no acute distress  Lungs: clear to auscultation bilaterally  Heart: regular rate and rhythm, S1, S2 normal, no murmur, click, rub or gallop  Abdomen: soft, non-tender; bowel sounds normal; no masses,  no organomegaly      Current Facility-Administered Medications:     acetaminophen (TYLENOL) tablet 650 mg, 650 mg, Oral, Q6H PRN, Segun Talavera MD, 650 mg at 04/07/18 1605    dextrose 5 % and sodium chloride 0 45 % with KCl 20 mEq/L infusion, 125 mL/hr, Intravenous, Continuous, Alicia Varela MD, Last Rate: 125 mL/hr at 04/07/18 2204, 125 mL/hr at 04/07/18 2204    enoxaparin (LOVENOX) subcutaneous injection 40 mg, 40 mg, Subcutaneous, Q24H [facility], Alicia Varela MD, 40 mg at 04/07/18 0816    hydrALAZINE (APRESOLINE) injection 5 mg, 5 mg, Intravenous, Q4H PRN, Alicia Varela MD    HYDROmorphone (DILAUDID) injection 0 5 mg, 0 5 mg, Intravenous, Q1H PRN, Alicia Varela MD    influenza inactivated quadrivalent vaccine (FLULAVAL) IM injection 0 5 mL, 0 5 mL, Intramuscular, Prior to discharge, Alicia Varela MD    montelukast (SINGULAIR) tablet 10 mg, 10 mg, Oral, HS, Alicia Varela MD, 10 mg at 04/07/18 3590    [facility]ron [facility]) injection 4 mg, 4 mg, Intravenous, Q4H PRN, Kp Ding MD    pantoprazole (PROTONIX) injection 40 mg, 40 mg, Intravenous, Q24H [address], Kp Ding MD, 40 mg at 04/07/18 3111              Lab, Imaging and other studies:  I have personally reviewed pertinent lab results  , CBC:   Lab Results   Component Value Date    WBC 5 34 04/08/2018    HGB 14 1 04/08/2018    HCT 42 3 04/08/2018    MCV 90 04/08/2018     04/08/2018    MCH 29 9 04/08/2018    MCHC 33 3 04/08/2018    RDW 13 0 04/08/2018    MPV 10 6 04/08/2018   , CMP:   Lab Results   Component Value Date     04/08/2018    K 3 6 04/08/2018     04/08/2018    CO2 28 04/08/2018    ANIONGAP 6 04/08/2018    BUN 8 04/08/2018    CREATININE 1 12 04/08/2018    GLUCOSE 103 04/08/2018    CALCIUM 8 2 (L) 04/08/2018    EGFR 81 04/08/2018     Labs in chart were reviewed  Lab Results   Component Value Date    WBC 5 34 04/08/2018    WBC 6 54 04/17/2015    HGB 14 1 04/08/2018    HGB 16 3 04/17/2015    HCT 42 3 04/08/2018    HCT 47 3 04/17/2015     04/08/2018     04/17/2015     Lab Results   Component Value Date     04/08/2018     04/17/2015    K 3 6 04/08/2018    K 4 6 04/17/2015     04/08/2018     04/17/2015    CO2 28 04/08/2018    CO2 30 04/17/2015    BUN 8 04/08/2018    BUN 14 04/17/2015    CREATININE 1 12 04/08/2018    CREATININE 0 94 04/17/2015    GLUCOSE 103 04/08/2018    GLUCOSE 103 04/17/2015       VTE Pharmacologic Prophylaxis: Enoxaparin (Lovenox)  VTE Mechanical Prophylaxis: sequential compression device    Assessment:  44-year-old male with enterocolitis  - will D/C today  -surg soft diet  -DVT prophylaxis     Tachycardia  -currently heart rate of 76    Patient Active Problem List   Diagnosis    Enterocolitis          This text is generated with voice recognition software  There may be translation, syntax,  or grammatical errors   If you have any questions, please contact the dictating provider      Vamsi Galeana PA-C

## 2018-04-09 NOTE — DISCHARGE SUMMARY
Discharge Summary - Nydia Ortiz 41 y o  male MRN: [id number]    Unit/Bed#: -01 Encounter: [id number]    Admission Date: 4/7/2018   Discharge Date: 04/08/18    Admitting Diagnosis:   Stomach pain [R10 9]  Partial small bowel obstruction ([address] ) [K56 600]    Discharge Diagnoses: Principal Problem:    Enterocolitis      Consultations: Internal medicine    Procedures Performed: None    Hospital Course: Nydia Ortiz is a 41 y o  male admitted for abdominal pain, possible enterocolitis  CT abd/pelv in ED revealed mild enterocolitis, hiatal hernia  Pt was given clears diet on hospital day 1 which he tolerated well  Flu swab taken which was normal  Internal medicine was consulted due to hypertension and tachycardia, both which resolved during admission  Hospital day 2 patient was feeling well  He was instructed to eat a bland diet for a few days  Patient was discharged in good condition  Condition at Discharge: good     Discharge instructions/Information to patient and family:   See after visit summary for information provided to patient and family  Provisions for Follow-Up Care:  See after visit summary for information related to follow-up care and any pertinent home health orders  Disposition: See After Visit Summary for discharge disposition information  Planned Readmission: No    Discharge Statement   I spent 20 minutes discharging the patient  This time was spent on the day of discharge  I had direct contact with the patient on the day of discharge  Additional documentation is required if more than 30 minutes were spent on discharge  Discharge Medications:  See after visit summary for reconciled discharge medications provided to patient and family        Annamarie Reeves PA-C

## 2018-04-09 NOTE — CASE MANAGEMENT
Ana Rosales, RN Registered Nurse Signed   Case Management Date of Service: 4/7/2018  1:18 PM         []Hide copied text  Initial Clinical Review     Admission: Date/Time/Statement: 4/7/18 @ 0331            Orders Placed This Encounter   Procedures    Inpatient Admission (expected length of stay for this patient is greater than two midnights)       Standing Status:   Standing       Number of Occurrences:   1       Order Specific Question:   Admitting Physician       Answer:   ROS ABREU [[id number]]       Order Specific Question:   Level of Care       Answer:   Med Surg [16]       Order Specific Question:   Estimated length of stay       Answer:   More than 2 Midnights       Order Specific Question:   Certification       Answer:   I certify that inpatient services are medically necessary for this patient for a duration of greater than two midnights  See H&P and MD Progress Notes for additional information about the patient's course of treatment             ED: Date/Time/Mode of Arrival:   ED Arrival Information      Expected Arrival Acuity Means of Arrival Escorted By Service Admission Type     - 4/6/2018 22:14 Emergent Walk-In Self General Medicine Emergency     Arrival Complaint     stomach pain             Chief Complaint:        Chief Complaint   Patient presents with    Abdominal Pain       C/o of generalized abd pain onset this afternoon followed by nausea and dry heaving  Denies diarrhea           History of Illness: This 28-year-old male presents today with lower abdominal pain  Patient states symptoms began earlier this evening   Patient describes it as a dull aching pain across the entire lower portion of his abdomen with periodic exacerbations of ache more cramping type severe pain   Patient denies history of similar symptoms in the past   Patient's last bowel movement was at 6:00 p m , during the pain   Patient states that this did not make the pain any better or worse   Patient denies any fevers or chills   Patient does relay episode of vomiting and several episodes of dry heaving   Patient denies any recent sick contacts, travel, unusual food intake   Patient does not take any medications regularly and only past medical history is asthma   Patient denies any urinary complaints       ED Vital Signs:            ED Triage Vitals [04/06/18 2317]   Temperature Pulse Respirations Blood Pressure SpO2   98 6 °F (37 °C) (!) 120 18 (!) 180/110 94 %       Temp Source Heart Rate Source Patient Position - Orthostatic VS BP Location FiO2 (%)   Oral Monitor Sitting Right arm --       Pain Score           6                Wt Readings from Last 1 Encounters:   04/07/18 118 kg (260 lb 2 3 oz)         Vital Signs (abnormal):   Date and Time Temp Pulse SpO2 Resp BP   04/07/18 0400 -- 100 96 % 18 137/92   04/07/18 0305 --  106 96 % 18  168/105   04/07/18 0143 --  113 94 % 18  161/105   04/07/18 0032 --  120 94 % 18  162/108         Abnormal Labs/Diagnostic Test Results: Creat 1 41, , WBC 11 22, H/H 17 2/49 5, Neutros PCT 82     CT of Abd/Pelvis:       Impression:     1   Mild abnormal appearance of the small and large bowel, suggesting mild enterocolitis, either infectious or inflammatory  2   Hiatal hernia with thickening of the distal esophagus, likely related to reflux esophagitis   Consider follow-up upper endoscopy  3   Mild abnormal appearance of the duodenum, most compatible with duodenitis   Consider follow-up upper endoscopy    4   Mild submucosal fat replacement involving the small and large bowel, likely related to either obesity or chronic inflammatory bowel disease           EKG: Sinus tachycardia      ED Treatment:              Medication Administration from 04/06/2018 2214 to 04/07/2018 6993        Date/Time Order Dose Route Action Action by Comments       04/07/2018 0031 ondansetron (ZOFRAN) injection 4 mg 4 mg Intravenous Given Dario Garcia RN         04/07/2018 0143 sodium chloride 0 9 % bolus 1,000 mL 0 mL Intravenous Stopped Stuart Sanchez RN         04/07/2018 0034 sodium chloride 0 9 % bolus 1,000 mL 1,000 mL Intravenous New Bag Stuart Sanchez RN         04/07/2018 0347 sodium chloride 0 9 % bolus 1,000 mL 1,000 mL Intravenous New Bag David Fajardo RN            Physical Exam   Constitutional: He is oriented to person, place, and time  He appears well-developed and well-nourished  He is cooperative  No distress  Cardiovascular: Regular rhythm, normal heart sounds and normal pulses   Tachycardia present     No murmur heard  Pulses:       Dorsalis pedis pulses are 2+ on the right side, and 2+ on the left side  Pulmonary/Chest: Effort normal and breath sounds normal  He exhibits no tenderness  Abdominal: Soft  He exhibits no distension  There is tenderness in the right lower quadrant, suprapubic area and left lower quadrant  There is no rebound and no guarding  Neurological: He is alert and oriented to person, place, and time  He has normal strength  No cranial nerve deficit or sensory deficit  Coordination normal  GCS eye subscore is 4  GCS verbal subscore is 5  GCS motor subscore is 6       Past Medical/Surgical History:         Active Ambulatory Problems     Diagnosis Date Noted    No Active Ambulatory Problems           Resolved Ambulatory Problems     Diagnosis Date Noted    No Resolved Ambulatory Problems           Past Medical History:   Diagnosis Date    Asthma           Admitting Diagnosis: Stomach pain [R10 9]  Partial small bowel obstruction ([address] ) [K56 600]     Age/Sex: 41 y o  male     Assessment/Plan:      72-year-old male with possible enterocolitis  -will allow clears for breakfast  -will see how patient tolerates clears  -DVT prophylaxis  -nausea and pain control     Tachycardia  -currently heart rate of 92  -continue IV fluids      Admission Orders:  Inpatient/MedSurg  Consult Internal Medicine r/e partial bowel obstruction           Bilateral Sequential Compression Device  Clear Liquids  D5%NSS 0 45% + KCL 20 mEq @ 125     Scheduled Meds:   Current Facility-Administered Medications:  enoxaparin 40 mg Subcutaneous Q24H [facility]   influenza vaccine 0 5 mL Intramuscular Prior to discharge   montelukast 10 mg Oral HS   pantoprazole 40 mg Intravenous Q24H [facility]               Thank you,  [address] [facility] in the [facility] by Renzo Winn for 2017  Network Utilization Review Department  Phone: [phone]; Fax [phone]  ATTENTION: The Network Utilization Review Department is now centralized for our 7 Facilities  Make a note that we have a new phone and fax numbers for our Department  Please call with any questions or concerns to [phone] and carefully follow the prompts so that you are directed to the right person  All voicemails are confidential  Fax any determinations, approvals, denials, and requests for initial or continue stay review clinical to [phone]  Due to HIGH CALL volume, it would be easier if you could please send faxed requests to expedite your requests and in part, help us provide discharge notifications faster

## 2018-12-17 ENCOUNTER — HOSPITAL ENCOUNTER (EMERGENCY)
Facility: HOSPITAL | Age: 43
Discharge: HOME | End: 2018-12-17
Attending: EMERGENCY MEDICINE
Payer: COMMERCIAL

## 2018-12-17 ENCOUNTER — APPOINTMENT (EMERGENCY)
Dept: ULTRASOUND IMAGING | Facility: HOSPITAL | Age: 43
End: 2018-12-17
Payer: COMMERCIAL

## 2018-12-17 VITALS
OXYGEN SATURATION: 100 % | SYSTOLIC BLOOD PRESSURE: 145 MMHG | HEART RATE: 102 BPM | DIASTOLIC BLOOD PRESSURE: 93 MMHG | TEMPERATURE: 99.4 F | WEIGHT: 226.41 LBS | BODY MASS INDEX: 30.71 KG/M2 | RESPIRATION RATE: 19 BRPM

## 2018-12-17 DIAGNOSIS — E86.0 DEHYDRATION: Primary | ICD-10-CM

## 2018-12-17 DIAGNOSIS — R11.2 NON-INTRACTABLE VOMITING WITH NAUSEA, UNSPECIFIED VOMITING TYPE: ICD-10-CM

## 2018-12-17 LAB
ALBUMIN SERPL BCP-MCNC: 4.2 G/DL (ref 3.5–5)
ALP SERPL-CCNC: 64 U/L (ref 46–116)
ALT SERPL W P-5'-P-CCNC: 67 U/L (ref 12–78)
ANION GAP SERPL CALCULATED.3IONS-SCNC: 13 MMOL/L (ref 4–13)
AST SERPL W P-5'-P-CCNC: 27 U/L (ref 5–45)
BACTERIA UR QL AUTO: ABNORMAL /HPF
BASOPHILS # BLD AUTO: 0.02 THOUSANDS/ΜL (ref 0–0.1)
BASOPHILS NFR BLD AUTO: 0 % (ref 0–1)
BILIRUB SERPL-MCNC: 0.8 MG/DL (ref 0.2–1)
BILIRUB UR QL STRIP: NEGATIVE
BUN SERPL-MCNC: 19 MG/DL (ref 5–25)
CALCIUM SERPL-MCNC: 8.4 MG/DL (ref 8.3–10.1)
CHLORIDE SERPL-SCNC: 105 MMOL/L (ref 100–108)
CLARITY UR: CLEAR
CO2 SERPL-SCNC: 25 MMOL/L (ref 21–32)
COLOR UR: YELLOW
CREAT SERPL-MCNC: 1.14 MG/DL (ref 0.6–1.3)
EOSINOPHIL # BLD AUTO: 0.04 THOUSAND/ΜL (ref 0–0.61)
EOSINOPHIL NFR BLD AUTO: 0 % (ref 0–6)
ERYTHROCYTE [DISTWIDTH] IN BLOOD BY AUTOMATED COUNT: 13.2 % (ref 11.6–15.1)
GFR SERPL CREATININE-BSD FRML MDRD: 79 ML/MIN/1.73SQ M
GLUCOSE SERPL-MCNC: 114 MG/DL (ref 65–140)
GLUCOSE UR STRIP-MCNC: NEGATIVE MG/DL
HCT VFR BLD AUTO: 50 % (ref 36.5–49.3)
HGB BLD-MCNC: 16.8 G/DL (ref 12–17)
HGB UR QL STRIP.AUTO: NEGATIVE
IMM GRANULOCYTES # BLD AUTO: 0.04 THOUSAND/UL (ref 0–0.2)
IMM GRANULOCYTES NFR BLD AUTO: 0 % (ref 0–2)
KETONES UR STRIP-MCNC: NEGATIVE MG/DL
LEUKOCYTE ESTERASE UR QL STRIP: NEGATIVE
LIPASE SERPL-CCNC: 101 U/L (ref 73–393)
LYMPHOCYTES # BLD AUTO: 0.25 THOUSANDS/ΜL (ref 0.6–4.47)
LYMPHOCYTES NFR BLD AUTO: 2 % (ref 14–44)
MCH RBC QN AUTO: 30.3 PG (ref 26.8–34.3)
MCHC RBC AUTO-ENTMCNC: 33.6 G/DL (ref 31.4–37.4)
MCV RBC AUTO: 90 FL (ref 82–98)
MONOCYTES # BLD AUTO: 0.5 THOUSAND/ΜL (ref 0.17–1.22)
MONOCYTES NFR BLD AUTO: 5 % (ref 4–12)
NEUTROPHILS # BLD AUTO: 10.08 THOUSANDS/ΜL (ref 1.85–7.62)
NEUTS SEG NFR BLD AUTO: 93 % (ref 43–75)
NITRITE UR QL STRIP: NEGATIVE
NON-SQ EPI CELLS URNS QL MICRO: ABNORMAL /HPF
NRBC BLD AUTO-RTO: 0 /100 WBCS
PH UR STRIP.AUTO: 7 [PH] (ref 4.5–8)
PLATELET # BLD AUTO: 164 THOUSANDS/UL (ref 149–390)
PMV BLD AUTO: 10.3 FL (ref 8.9–12.7)
POTASSIUM SERPL-SCNC: 3.6 MMOL/L (ref 3.5–5.3)
PROT SERPL-MCNC: 7.7 G/DL (ref 6.4–8.2)
PROT UR STRIP-MCNC: ABNORMAL MG/DL
RBC # BLD AUTO: 5.54 MILLION/UL (ref 3.88–5.62)
RBC #/AREA URNS AUTO: ABNORMAL /HPF
SODIUM SERPL-SCNC: 143 MMOL/L (ref 136–145)
SP GR UR STRIP.AUTO: 1.01 (ref 1–1.03)
UROBILINOGEN UR QL STRIP.AUTO: 1 E.U./DL
WBC # BLD AUTO: 10.93 THOUSAND/UL (ref 4.31–10.16)
WBC #/AREA URNS AUTO: ABNORMAL /HPF

## 2018-12-17 PROCEDURE — 81001 URINALYSIS AUTO W/SCOPE: CPT | Performed by: EMERGENCY MEDICINE

## 2018-12-17 PROCEDURE — 83690 ASSAY OF LIPASE: CPT | Performed by: EMERGENCY MEDICINE

## 2018-12-17 PROCEDURE — 76705 ECHO EXAM OF ABDOMEN: CPT

## 2018-12-17 PROCEDURE — 99284 EMERGENCY DEPT VISIT MOD MDM: CPT

## 2018-12-17 PROCEDURE — 80053 COMPREHEN METABOLIC PANEL: CPT | Performed by: EMERGENCY MEDICINE

## 2018-12-17 PROCEDURE — 85025 COMPLETE CBC W/AUTO DIFF WBC: CPT | Performed by: EMERGENCY MEDICINE

## 2018-12-17 PROCEDURE — 96375 TX/PRO/DX INJ NEW DRUG ADDON: CPT

## 2018-12-17 PROCEDURE — 36415 COLL VENOUS BLD VENIPUNCTURE: CPT | Performed by: EMERGENCY MEDICINE

## 2018-12-17 PROCEDURE — 96374 THER/PROPH/DIAG INJ IV PUSH: CPT

## 2018-12-17 PROCEDURE — 96361 HYDRATE IV INFUSION ADD-ON: CPT

## 2018-12-17 RX ORDER — KETOROLAC TROMETHAMINE 30 MG/ML
15 INJECTION, SOLUTION INTRAMUSCULAR; INTRAVENOUS ONCE
Status: COMPLETED | OUTPATIENT
Start: 2018-12-17 | End: 2018-12-17

## 2018-12-17 RX ORDER — ONDANSETRON 2 MG/ML
4 INJECTION INTRAMUSCULAR; INTRAVENOUS ONCE
Status: COMPLETED | OUTPATIENT
Start: 2018-12-17 | End: 2018-12-17

## 2018-12-17 RX ORDER — ONDANSETRON 4 MG/1
4 TABLET, ORALLY DISINTEGRATING ORAL EVERY 6 HOURS PRN
Qty: 20 TABLET | Refills: 0 | Status: ON HOLD | OUTPATIENT
Start: 2018-12-17 | End: 2021-03-20 | Stop reason: CLARIF

## 2018-12-17 RX ADMIN — SODIUM CHLORIDE 1000 ML: 0.9 INJECTION, SOLUTION INTRAVENOUS at 18:01

## 2018-12-17 RX ADMIN — SODIUM CHLORIDE 1000 ML: 0.9 INJECTION, SOLUTION INTRAVENOUS at 15:01

## 2018-12-17 RX ADMIN — KETOROLAC TROMETHAMINE 15 MG: 30 INJECTION, SOLUTION INTRAMUSCULAR at 15:22

## 2018-12-17 RX ADMIN — ONDANSETRON 4 MG: 2 INJECTION INTRAMUSCULAR; INTRAVENOUS at 15:21

## 2018-12-17 NOTE — ED NOTES
Pt laying on stretcher with HOB in negative distress  IVF's infusing with negative complications  VS  No complaints at present time  Pt unable to void at present time  Will continue to monitor       Melvi Cage RN  12/17/18 3094

## 2018-12-17 NOTE — ED PROVIDER NOTES
History  Chief Complaint   Patient presents with    Flank Pain     Pt c/o nausea and vomiting last pm, now states he's having bilateral flank pain  Denies fevers or urinary s/s   Vomiting     61-year-old male with a history small-bowel obstruction presents with vomiting and lower back pain  Patient states he began vomiting last night at 2:00 a m  He has been unable to tolerate anything p o  since that time  He complains of generalized myalgias and pain particularly in the bilateral lower lumbar region since vomiting began  He a bowel movement today and denies diarrhea, constipation  He denies fevers, chills, cough or shortness of breath  He admits to generalized abdominal cramping  History provided by:  Patient  Vomiting   Severity:  Unable to specify  Duration:  1 day  Number of daily episodes:  Numerous  Progression:  Unchanged  Chronicity:  New  Recent urination:  Decreased  Relieved by:  Nothing  Worsened by:  Nothing  Ineffective treatments:  None tried  Associated symptoms: abdominal pain and myalgias    Associated symptoms: no chills, no cough, no diarrhea, no fever, no headaches and no sore throat        Prior to Admission Medications   Prescriptions Last Dose Informant Patient Reported? Taking? Mometasone Furo-Formoterol Fum (DULERA) 200-5 MCG/ACT AERO   Yes Yes   Sig: Inhale 2 puffs 2 (two) times a day   albuterol (VENTOLIN HFA) 90 mcg/act inhaler   Yes Yes   Sig: Inhale 1-2 puffs   montelukast (SINGULAIR) 10 mg tablet   Yes Yes   Sig: Take 10 mg by mouth daily at bedtime  Facility-Administered Medications: None       Past Medical History:   Diagnosis Date    Asthma        Past Surgical History:   Procedure Laterality Date    THUMB AMPUTATION Left 09/2015       History reviewed  No pertinent family history  I have reviewed and agree with the history as documented      Social History   Substance Use Topics    Smoking status: Never Smoker    Smokeless tobacco: Never Used   Nando Temple Alcohol use Yes      Comment: occasionally        Review of Systems   Constitutional: Negative for chills and fever  HENT: Negative for sore throat and trouble swallowing  Eyes: Negative for visual disturbance  Respiratory: Negative for cough and shortness of breath  Cardiovascular: Negative for chest pain, palpitations and leg swelling  Gastrointestinal: Positive for abdominal pain and vomiting  Negative for diarrhea and nausea  Genitourinary: Negative for dysuria  Musculoskeletal: Positive for myalgias  Negative for neck pain and neck stiffness  Neurological: Negative for dizziness, weakness, numbness and headaches  Physical Exam  Physical Exam   Constitutional: He is oriented to person, place, and time  He appears well-developed and well-nourished  HENT:   Head: Atraumatic  Eyes: Pupils are equal, round, and reactive to light  EOM are normal    Neck: Normal range of motion  Neck supple  Cardiovascular: Regular rhythm, normal heart sounds, intact distal pulses and normal pulses  Tachycardia present  Pulmonary/Chest: Effort normal and breath sounds normal  No respiratory distress  Abdominal: Soft  He exhibits no distension  There is tenderness in the right upper quadrant  There is no rigidity, no rebound, no guarding and negative Bobo's sign  Musculoskeletal: Normal range of motion  He exhibits no edema or tenderness  Neurological: He is alert and oriented to person, place, and time  He has normal strength  No cranial nerve deficit or sensory deficit  Skin: Skin is warm and dry  Psychiatric: He has a normal mood and affect         Vital Signs  ED Triage Vitals [12/17/18 1432]   Temperature Pulse Respirations Blood Pressure SpO2   99 4 °F (37 4 °C) (!) 124 18 161/98 97 %      Temp Source Heart Rate Source Patient Position - Orthostatic VS BP Location FiO2 (%)   Oral Monitor Sitting Right arm --      Pain Score       4           Vitals:    12/17/18 1610 12/17/18 1630 12/17/18 1900 12/17/18 1930   BP: 145/93 145/93     Pulse: (!) 109  (!) 106 102   Patient Position - Orthostatic VS: Lying          Visual Acuity      ED Medications  Medications   ondansetron (ZOFRAN) injection 4 mg (4 mg Intravenous Given 12/17/18 1521)   ketorolac (TORADOL) injection 15 mg (15 mg Intravenous Given 12/17/18 1522)   sodium chloride 0 9 % bolus 1,000 mL (0 mL Intravenous Stopped 12/17/18 1717)   sodium chloride 0 9 % bolus 1,000 mL (0 mL Intravenous Stopped 12/17/18 1951)       Diagnostic Studies  Results Reviewed     Procedure Component Value Units Date/Time    Urine Microscopic [[id number]]  (Abnormal) Collected:  12/17/18 1717    Lab Status:  Final result Specimen:  Urine from Urine, Clean Catch Updated:  12/17/18 1750     RBC, UA 1-2 (A) /hpf      WBC, UA 0-1 (A) /hpf      Epithelial Cells None Seen /hpf      Bacteria, UA None Seen /hpf     UA w Reflex to Microscopic [[id number]]  (Abnormal) Collected:  12/17/18 1717    Lab Status:  Final result Specimen:  Urine from Urine, Clean Catch Updated:  12/17/18 1740     Color, UA Yellow     Clarity, UA Clear     Specific Gravity, UA 1 015     pH, UA 7 0     Leukocytes, UA Negative     Nitrite, UA Negative     Protein, UA 30 (1+) (A) mg/dl      Glucose, UA Negative mg/dl      Ketones, UA Negative mg/dl      Urobilinogen, UA 1 0 E U /dl      Bilirubin, UA Negative     Blood, UA Negative    CBC and differential [[id number]]  (Abnormal) Collected:  12/17/18 1502    Lab Status:  Final result Specimen:  Blood from Arm, Right Updated:  12/17/18 1603     WBC 10 93 (H) Thousand/uL      RBC 5 54 Million/uL      Hemoglobin 16 8 g/dL      Hematocrit 50 0 (H) %      MCV 90 fL      MCH 30 3 pg      MCHC 33 6 g/dL      RDW 13 2 %      MPV 10 3 fL      Platelets 851 Thousands/uL      nRBC 0 /100 WBCs      Neutrophils Relative 93 (H) %      Immat GRANS % 0 %      Lymphocytes Relative 2 (L) %      Monocytes Relative 5 %      Eosinophils Relative 0 %      Basophils Relative 0 %      Neutrophils Absolute 10 08 (H) Thousands/µL      Immature Grans Absolute 0 04 Thousand/uL      Lymphocytes Absolute 0 25 (L) Thousands/µL      Monocytes Absolute 0 50 Thousand/µL      Eosinophils Absolute 0 04 Thousand/µL      Basophils Absolute 0 02 Thousands/µL     Narrative: This is an appended report  These results have been appended to a previously verified report  CMP [[id number]] Collected:  12/17/18 1502    Lab Status:  Final result Specimen:  Blood from Arm, Right Updated:  12/17/18 1529     Sodium 143 mmol/L      Potassium 3 6 mmol/L      Chloride 105 mmol/L      CO2 25 mmol/L      ANION GAP 13 mmol/L      BUN 19 mg/dL      Creatinine 1 14 mg/dL      Glucose 114 mg/dL      Calcium 8 4 mg/dL      AST 27 U/L      ALT 67 U/L      Alkaline Phosphatase 64 U/L      Total Protein 7 7 g/dL      Albumin 4 2 g/dL      Total Bilirubin 0 80 mg/dL      eGFR 79 ml/min/1 73sq m     Narrative:         National Kidney Disease Education Program recommendations are as follows:  GFR calculation is accurate only with a steady state creatinine  Chronic Kidney disease less than 60 ml/min/1 73 sq  meters  Kidney failure less than 15 ml/min/1 73 sq  meters  Lipase [[id number]]  (Normal) Collected:  12/17/18 1502    Lab Status:  Final result Specimen:  Blood from Arm, Right Updated:  12/17/18 1529     Lipase 101 u/L                  US abdomen limited   Final Result by Etta Mckeon MD (12/17 1552)      Hepatic steatosis      Hepatomegaly      Workstation performed: [id number]                    Procedures  Procedures       Phone Contacts  ED Phone Contact    ED Course  ED Course as of Dec 18 1213   Mon Dec 17, 2018   1517 Nonspecific leukocytosis  14:46 patient with vomiting, generalized abdominal pain and myalgias  Will check labs, UA and right upper quadrant ultrasound  Will give IV hydration and antiemetics  15:59 reexamined patient and abdominal exam is benign  No tenderness upon palpation  Will p o  Challenge  16:22 patient tolerating ice chips  17:02 patient feels well  Ambulating to the restroom to provide urine sample  17:20 HR remains elevated  Will give a second fluid bolus    19:00 HR = 98  Patient feels well  No vomiting in ED and patient tolerating po  MDM  Number of Diagnoses or Management Options  Dehydration: new and requires workup  Non-intractable vomiting with nausea, unspecified vomiting type: new and requires workup  Diagnosis management comments: Patient presents with vomiting and subsequent pain in lower back  Initial abdominal exam revealed right upper quadrant tenderness however ultrasound shows no evidence of gallstones  Patient received IV fluids and antiemetics  Repeat abdominal exam is benign  We discussed the risks and benefits of further imaging and I do not believe that CT is indicated at this time  Patient received IV fluids with improvement in his heart rate  He is feeling better and tolerating p o  [place] Do not suspect acute surgical process including but not limited to acute cholecystitis, acute appendicitis, SBO, mesenteric ischemia, AAA, vascular dissection, vascular occlusion, perforated viscus, testicular torsion  Do not suspect intrathoracic cause of abdominal pain  Patient advised to return to ED if symptoms worsen or persist  Patient also advised to follow up with PCP          Amount and/or Complexity of Data Reviewed  Clinical lab tests: ordered and reviewed  Tests in the radiology section of CPT®: ordered and reviewed  Tests in the medicine section of CPT®: ordered and reviewed  Review and summarize past medical records: yes  Independent visualization of images, tracings, or specimens: yes    Risk of Complications, Morbidity, and/or Mortality  Presenting problems: high  Diagnostic procedures: moderate  Management options: low    Patient Progress  Patient progress: stable    CritCare Time    Disposition  Final diagnoses:   Dehydration Non-intractable vomiting with nausea, unspecified vomiting type     Time reflects when diagnosis was documented in both MDM as applicable and the Disposition within this note     Time User Action Codes Description Comment    12/17/2018  7:08 PM Solomon Meredith [E86 0] Dehydration     12/17/2018  7:08 PM Solomon Meredith [R11 2] Non-intractable vomiting with nausea, unspecified vomiting type       ED Disposition     ED Disposition Condition Comment    Discharge  Steven Ortiz discharge to home/self care  Condition at discharge: Good        Follow-up Information     Follow up With Specialties Details Why Contact Info    Laila Rao MD Internal Medicine Schedule an appointment as soon as possible for a visit Return to ED sooner if symptoms worsen or persist  [facility]  [address]  [address] [place] [address]  [phone]            Discharge Medication List as of 12/17/2018  7:09 PM      START taking these medications    Details   ondansetron (ZOFRAN-ODT) 4 mg disintegrating tablet Take 1 tablet (4 mg total) by mouth every 6 (six) hours as needed for nausea or vomiting, Starting Mon 12/17/2018, Normal         CONTINUE these medications which have NOT CHANGED    Details   albuterol (VENTOLIN HFA) 90 mcg/act inhaler Inhale 1-2 puffs, Historical Med      Mometasone Furo-Formoterol Fum (DULERA) 200-5 MCG/ACT AERO Inhale 2 puffs 2 (two) times a day, Historical Med      montelukast (SINGULAIR) 10 mg tablet Take 10 mg by mouth daily at bedtime  , Until Discontinued, Historical Med           No discharge procedures on file      ED Provider  Electronically Signed by           Roberto Serrano DO  12/18/18 2581

## 2018-12-18 NOTE — DISCHARGE INSTRUCTIONS
Acute Nausea and Vomiting   WHAT YOU NEED TO KNOW:   Acute nausea and vomiting start suddenly, worsen quickly, and last a short time  DISCHARGE INSTRUCTIONS:   Return to the emergency department if:   · You see blood in your vomit or your bowel movements  · You have sudden, severe pain in your chest and upper abdomen after hard vomiting or retching  · You have swelling in your neck and chest      · You are dizzy, cold, and thirsty and your eyes and mouth are dry  · You are urinating very little or not at all  · You have muscle weakness, leg cramps, and trouble breathing  · Your heart is beating much faster than normal      · You continue to vomit for more than 48 hours  Contact your healthcare provider if:   · You have frequent dry heaves (vomiting but nothing comes out)  · Your nausea and vomiting does not get better or go away after you use medicine  · You have questions or concerns about your condition or treatment  Medicines: You may need any of the following:  · Medicines  may be given to calm your stomach and stop your vomiting  You may also need medicines to help you feel more relaxed or to stop nausea and vomiting caused by motion sickness  · Gastrointestinal stimulants  are used to help empty your stomach and bowels  This may help decrease nausea and vomiting  · Take your medicine as directed  Contact your healthcare provider if you think your medicine is not helping or if you have side effects  Tell him or her if you are allergic to any medicine  Keep a list of the medicines, vitamins, and herbs you take  Include the amounts, and when and why you take them  Bring the list or the pill bottles to follow-up visits  Carry your medicine list with you in case of an emergency  Prevent or manage acute nausea and vomiting:   · Do not drink alcohol  Alcohol may upset or irritate your stomach  Too much alcohol can also cause acute nausea and vomiting  · Control stress    Headaches due to stress may cause nausea and vomiting  Find ways to relax and manage your stress  Get more rest and sleep  · Drink more liquids as directed  Vomiting can lead to dehydration  It is important to drink more liquids to help replace lost body fluids  Ask your healthcare provider how much liquid to drink each day and which liquids are best for you  Your provider may recommend that you drink an oral rehydration solution (ORS)  ORS contains water, salts, and sugar that are needed to replace the lost body fluids  Ask what kind of ORS to use, how much to drink, and where to get it  · Eat smaller meals, more often  Eat small amounts of food every 2 to 3 hours, even if you are not hungry  Food in your stomach may decrease your nausea  · Talk to your healthcare provider before you take over-the-counter (OTC) medicines  These medicines can cause serious problems if you use certain other medicines, or you have a medical condition  You may have problems if you use too much or use them for longer than the label says  Follow directions on the label carefully  Follow up with your healthcare provider as directed:  Write down your questions so you remember to ask them during your follow-up visits  © 2017 [address] [employer] Information is for End User's use only and may not be sold, redistributed or otherwise used for commercial purposes  All illustrations and images included in CareNotes® are the copyrighted property of [employer]  or [employer]zo Winn  The above information is an  only  It is not intended as medical advice for individual conditions or treatments  Talk to your doctor, nurse or pharmacist before following any medical regimen to see if it is safe and effective for you

## 2019-05-02 ENCOUNTER — TRANSCRIBE ORDERS (OUTPATIENT)
Dept: ADMINISTRATIVE | Facility: HOSPITAL | Age: 44
End: 2019-05-02

## 2019-05-02 DIAGNOSIS — R06.83 SNORING: Primary | ICD-10-CM

## 2019-05-02 DIAGNOSIS — G47.30 SLEEP APNEA, UNSPECIFIED TYPE: ICD-10-CM

## 2019-05-23 ENCOUNTER — OFFICE VISIT (OUTPATIENT)
Dept: SLEEP CENTER | Facility: CLINIC | Age: 44
End: 2019-05-23
Payer: COMMERCIAL

## 2019-05-23 VITALS
BODY MASS INDEX: 31.97 KG/M2 | HEART RATE: 101 BPM | HEIGHT: 72 IN | SYSTOLIC BLOOD PRESSURE: 153 MMHG | DIASTOLIC BLOOD PRESSURE: 108 MMHG | WEIGHT: 236 LBS

## 2019-05-23 DIAGNOSIS — R06.83 SNORING: ICD-10-CM

## 2019-05-23 DIAGNOSIS — J31.0 CHRONIC RHINITIS: ICD-10-CM

## 2019-05-23 DIAGNOSIS — R53.83 FATIGUE, UNSPECIFIED TYPE: ICD-10-CM

## 2019-05-23 DIAGNOSIS — J45.20 MILD INTERMITTENT ASTHMA WITHOUT COMPLICATION: ICD-10-CM

## 2019-05-23 DIAGNOSIS — G47.33 OBSTRUCTIVE SLEEP APNEA SYNDROME: Primary | ICD-10-CM

## 2019-05-23 DIAGNOSIS — G47.26 SHIFT WORK SLEEP DISORDER: ICD-10-CM

## 2019-05-23 DIAGNOSIS — E66.9 OBESITY (BMI 30-39.9): ICD-10-CM

## 2019-05-23 PROCEDURE — 99244 OFF/OP CNSLTJ NEW/EST MOD 40: CPT | Performed by: INTERNAL MEDICINE

## 2019-05-31 ENCOUNTER — TELEPHONE (OUTPATIENT)
Dept: SLEEP CENTER | Facility: CLINIC | Age: 44
End: 2019-05-31

## 2019-06-13 ENCOUNTER — TRANSCRIBE ORDERS (OUTPATIENT)
Dept: SLEEP CENTER | Facility: CLINIC | Age: 44
End: 2019-06-13

## 2019-06-13 ENCOUNTER — TELEPHONE (OUTPATIENT)
Dept: SLEEP CENTER | Facility: CLINIC | Age: 44
End: 2019-06-13

## 2019-06-13 DIAGNOSIS — G47.33 OBSTRUCTIVE SLEEP APNEA SYNDROME: Primary | ICD-10-CM

## 2019-06-24 ENCOUNTER — HOSPITAL ENCOUNTER (OUTPATIENT)
Dept: SLEEP CENTER | Facility: CLINIC | Age: 44
Discharge: HOME | End: 2019-06-24
Payer: COMMERCIAL

## 2019-06-24 DIAGNOSIS — G47.33 OBSTRUCTIVE SLEEP APNEA SYNDROME: ICD-10-CM

## 2019-06-24 PROCEDURE — G0399 HOME SLEEP TEST/TYPE 3 PORTA: HCPCS

## 2019-06-27 ENCOUNTER — TELEPHONE (OUTPATIENT)
Dept: SLEEP CENTER | Facility: CLINIC | Age: 44
End: 2019-06-27

## 2019-07-03 ENCOUNTER — HOSPITAL ENCOUNTER (OUTPATIENT)
Dept: SLEEP CENTER | Facility: CLINIC | Age: 44
Discharge: HOME | End: 2019-07-03
Payer: COMMERCIAL

## 2019-07-03 DIAGNOSIS — G47.33 OBSTRUCTIVE SLEEP APNEA SYNDROME: ICD-10-CM

## 2019-07-03 PROCEDURE — 95811 POLYSOM 6/>YRS CPAP 4/> PARM: CPT

## 2019-07-04 NOTE — PROGRESS NOTES
Sleep Study Documentation    Pre-Sleep Study       Sleep testing procedure explained to patient:YES    Patient napped prior to study:NO    Caffeine:Nightshift worker after midnight  Caffeine use:NO    Alcohol:Nightshift workers after 7AM: Alcohol use:NO    Typical day for patient:YES       Study Documentation    Sleep Study Indications: ANASTASIYA    Sleep Study: Treatment   Optimal PAP pressure: 5  Leak:None  Snore:Eliminated  REM Obtained:yes  Supplemental O2: no    Minimum SaO2 98  Baseline SaO2 97  PAP mask tried (list all)P10  PAP mask choice (final)P10  PAP mask type:pillows  PAP pressure at which snoring was eliminated 4  Minimum SaO2 at final PAP pressure 91  Mode of Therapy:CPAP  ETCO2:No  CPAP changed to BiPAP:No    Mode of Therapy:CPAP    EKG abnormalities: no     EEG abnormalities: no    Sleep Study Recorded < 2 hours: N/A    Sleep Study Recorded > 2 hours but incomplete study: N/A    Sleep Study Recorded 6 hours but no sleep obtained: NO    Patient classification: employed       Post-Sleep Study    Medication used at bedtime or during sleep study:NO    Patient reports time it took to fall asleep:30 to 60 minutes    Patient reports waking up during study:1 to 2 times  Patient reports returning to sleep without difficulty  Patient reports sleeping 6 to 8 hours without dreaming  Patient reports sleep during study:better than usual    Patient rated sleepiness: Not sleepy or tired    PAP treatment:yes: Post PAP treatment patient reports feeling better and  would wear PAP mask at home

## 2019-07-05 DIAGNOSIS — G47.33 OBSTRUCTIVE SLEEP APNEA SYNDROME: Primary | ICD-10-CM

## 2019-07-09 ENCOUNTER — TELEPHONE (OUTPATIENT)
Dept: SLEEP CENTER | Facility: CLINIC | Age: 44
End: 2019-07-09

## 2019-07-09 NOTE — TELEPHONE ENCOUNTER
Discussed study results- scheduled follow-up & compliance follow-up @ [place]- patient wants set-up @ [facility]- paperwork to [facility]

## 2019-07-25 ENCOUNTER — OFFICE VISIT (OUTPATIENT)
Dept: SLEEP CENTER | Facility: CLINIC | Age: 44
End: 2019-07-25
Payer: COMMERCIAL

## 2019-07-25 VITALS
BODY MASS INDEX: 32.1 KG/M2 | SYSTOLIC BLOOD PRESSURE: 151 MMHG | DIASTOLIC BLOOD PRESSURE: 99 MMHG | WEIGHT: 237 LBS | HEART RATE: 91 BPM | HEIGHT: 72 IN

## 2019-07-25 DIAGNOSIS — R03.0 ELEVATED BLOOD PRESSURE READING WITHOUT DIAGNOSIS OF HYPERTENSION: ICD-10-CM

## 2019-07-25 DIAGNOSIS — G47.33 OBSTRUCTIVE SLEEP APNEA SYNDROME: Primary | ICD-10-CM

## 2019-07-25 DIAGNOSIS — J45.20 MILD INTERMITTENT ASTHMA WITHOUT COMPLICATION: ICD-10-CM

## 2019-07-25 DIAGNOSIS — J31.0 CHRONIC RHINITIS: ICD-10-CM

## 2019-07-25 DIAGNOSIS — G47.26 SHIFT WORK SLEEP DISORDER: ICD-10-CM

## 2019-07-25 DIAGNOSIS — E66.9 OBESITY (BMI 30-39.9): ICD-10-CM

## 2019-07-25 PROCEDURE — 99214 OFFICE O/P EST MOD 30 MIN: CPT | Performed by: INTERNAL MEDICINE

## 2019-07-25 NOTE — PROGRESS NOTES
Follow-Up Note - Sleep Kristin Ortiz  45 y o  male  UNA:[birth year]  [facility]:[id number]    CC: I saw this patient for follow-up in clinic today for his Sleep Disordered Breathing, Coexisting Sleep and Medical Problems  Patient had home sleep study was undertaken to evaluate for sleep disordered breathing, followed by a subsequent titration study  The patient is here to review results and to initiate therapy / discuss further options  The study demonstrated  : NISHA (respiratory event index of) 18 8 /hour  Minimum oxygen saturation was 82% and 9 4% of the study was spent with saturations less than 90%  The snore index was 45 9%  During the subsequent therapeutic study, sleep disordered breathing was successfully remediated with PAP at 5 cm H2O  There was severe periodic limb movements of sleep for an index of 40 5 per hour that persisted at the final pressure  PFSH, Problem List, Medications & Allergies were reviewed in EMR  Interval changes: none reported  He  has a past medical history of Asthma  He has a current medication list which includes the following prescription(s): albuterol, mometasone-formoterol, montelukast, and ondansetron  ROS: Reviewed (see attached)  Significant for allergies and asthma reasonably controlled on current medications  Hanna Sole DATA REVIEWED:  He got a machine around a week ago  using PAP > 4 hours/night at least 70% of the time & is benefitting from use  SUBJECTIVE: Regarding use of PAP, Ilya reports:   · He is experiencing no  adverse effects: mask dislodges during sleep  · He is   benefiting from use: sleeping better   · He is not experiencing restless leg symptoms and is not aware of jerking movements during sleep  Sleep Routine: He works shifts and reports getting 6-7 hrs sleep  ; he has difficulty initiating and maintaining sleep  episodically  He awakens with the aid of an alarm and is not always refreshed  He somewhat improved excessive drowsiness    He rated himself at Total score: 4 /24 on the [place] sleepiness scale  Habits: reports that he has never smoked  He has never used smokeless tobacco ,  reports that he drinks alcohol ,  reports that he does not use drugs  , Caffeine use: limited , Exercise routine: regular    OBJECTIVE: /99   Pulse 91   Ht 6' (1 829 m)   Wt 108 kg (237 lb)   BMI 32 14 kg/m²    Constitutional: Patient is well groomed; well appearing  Skin/Extrem: warm & dry; col & hydration normal; no edema  Psych: cooperativeand in no distress  Normal mood, affect & thought   CNS: Alert, orientated, clear & coherent speech  H&N: EOMI; NC/AT:no facial pressure marks, no rashes  ENMT Mucus membranes normal Nasal airway:patent  Oral airway: crowded  Resp:effort is normal CVS: RRR ABD:truncal obesity MSK:Gait normal     ASSESSMENT: Primary Sleep/Secondary(to Medical or Psych conditions) & comorbidities   1  Obstructive sleep apnea syndrome     2  Shift work sleep disorder     3  Chronic rhinitis     4  Mild intermittent asthma without complication     5  Obesity (BMI 30-39 9)     6  Elevated blood pressure reading without diagnosis of hypertension       PLAN:  1  Treatment with  PAP is medically necessary and Sera Foster is agreable to continue use  2  Care of equipment, methods to improve comfort using PAP and importance of compliance with therapy were discussed  3  Pressure setting: continue 5-8 cmH2O     4  Rx provided to replace supplies and Care coordinated with DME provider  5  Strategies for weight reduction were discussed  6  I advised on strategies to cope with shift work  He is due to change to regular shift in September of this year  7  No treatment was initiated for the periodic limb movements of sleep at this time  8  Follow-up is advised in 1 year or sooner if needed to monitor progress, compliance and to adjust therapy        Thank you for allowing me to participate in the care of this patient      Sincerely,    Authenticated electronically by Lincoln Medrano MD on 87/78/79   Board Certified Specialist

## 2019-09-26 ENCOUNTER — OFFICE VISIT (OUTPATIENT)
Dept: SLEEP CENTER | Facility: CLINIC | Age: 44
End: 2019-09-26
Payer: COMMERCIAL

## 2019-09-26 VITALS
HEART RATE: 92 BPM | DIASTOLIC BLOOD PRESSURE: 100 MMHG | SYSTOLIC BLOOD PRESSURE: 149 MMHG | WEIGHT: 240 LBS | BODY MASS INDEX: 31.81 KG/M2 | HEIGHT: 73 IN

## 2019-09-26 DIAGNOSIS — J31.0 CHRONIC RHINITIS: ICD-10-CM

## 2019-09-26 DIAGNOSIS — F51.12 INSUFFICIENT SLEEP SYNDROME: ICD-10-CM

## 2019-09-26 DIAGNOSIS — E66.9 OBESITY (BMI 30-39.9): ICD-10-CM

## 2019-09-26 DIAGNOSIS — G47.33 OBSTRUCTIVE SLEEP APNEA SYNDROME: Primary | ICD-10-CM

## 2019-09-26 DIAGNOSIS — J45.20 MILD INTERMITTENT ASTHMA WITHOUT COMPLICATION: ICD-10-CM

## 2019-09-26 DIAGNOSIS — R68.2 DRY MOUTH: ICD-10-CM

## 2019-09-26 PROCEDURE — 99214 OFFICE O/P EST MOD 30 MIN: CPT | Performed by: INTERNAL MEDICINE

## 2019-09-26 NOTE — PROGRESS NOTES
Follow-Up Note - Sleep Rossy Ortiz  45 y o  male  HEU:[birth year]  HAV:[id number]    CC: I saw this patient for follow-up in clinic today for his Sleep Disordered Breathing, Coexisting Sleep and Medical Problems  PFSH, Problem List, Medications & Allergies were reviewed in EMR  Interval changes: none reported  He  has a past medical history of Asthma  He has a current medication list which includes the following prescription(s): albuterol, mometasone-formoterol, montelukast, and ondansetron  ROS: Reviewed (see attached)  Allergies and asthma are controlled on current medications  DATA REVIEWED:  using PAP > 4 hours/night 60% of the time  Estimated NISHA 4 1/hour at pressure of 7 3cm H2O @90th percentile  Data shows no use for a few days that related to his shift work  SUBJECTIVE: Regarding use of PAP, Ilya reports:   · He is experiencing some adverse effects: dry mouth  · He is   benefiting from use: sleeping better   Sleep Routine: He is now working irregular date shift and reports getting 5-6 hrs sleep while adjusting to his new schedule; he has no difficulty initiating or maintaining sleep   He awakens with the aid of an alarm and feels refreshed  He denied excessive drowsiness   He rated himself at Total score: 4 /24 on the [place] sleepiness scale  Habits: reports that he has never smoked  He has never used smokeless tobacco ,  reports that he drinks alcohol ,  reports that he does not use drugs  , Caffeine use: limited , Exercise routine: irregular   OBJECTIVE: /100   Pulse 92   Ht 6' 1" (1 854 m)   Wt 109 kg (240 lb)   BMI 31 66 kg/m²    Constitutional: Patient is well groomed; well appearing  Skin/Extrem: warm & dry; col & hydration normal; no edema  Psych: cooperativeand in no distress  Mental State appears normal   CNS: Alert, orientated, clear & coherent speech  H&N: EOMI; NC/AT:no facial pressure marks, no rashes     ENMT Mucus membranes normal Nasal airway:patent  Oral airway: crowded  Resp:effort is normal CVS: RRR ABD:truncal obesity MSK:Gait normal     ASSESSMENT: Primary Sleep/Secondary(to Medical or Psych conditions) & comorbidities   1  Obstructive sleep apnea syndrome  PAP DME Pressure Change    PAP DME Resupply/Reorder   2  Insufficient sleep syndrome     3  Dry mouth     4  Chronic rhinitis     5  Mild intermittent asthma without complication     6  Obesity (BMI 30-39  9)       PLAN:  1  Treatment with  PAP is medically necessary and Norah Rodríguez is agreable to continue use  2  Care of equipment, methods to improve comfort using PAP and importance of compliance with therapy were discussed  3  Pressure setting: change 6-9 cmH2O     4  Rx provided to replace supplies and Care coordinated with DME provider  5  Strategies for weight reduction were discussed  6  I also advised allowing sufficient opportunity for sleep  7  Follow-up is advised in 1 year or sooner if needed to monitor progress, compliance and to adjust therapy  Thank you for allowing me to participate in the care of this patient      Sincerely,    Authenticated electronically by Ngozi Babb MD on 22/90/10   Board Certified Specialist

## 2019-09-26 NOTE — PATIENT INSTRUCTIONS

## 2019-10-02 ENCOUNTER — TELEPHONE (OUTPATIENT)
Dept: SLEEP CENTER | Facility: CLINIC | Age: 44
End: 2019-10-02

## 2019-11-13 ENCOUNTER — OFFICE VISIT (OUTPATIENT)
Dept: URGENT CARE | Facility: MEDICAL CENTER | Age: 44
End: 2019-11-13
Payer: COMMERCIAL

## 2019-11-13 VITALS
BODY MASS INDEX: 32.71 KG/M2 | WEIGHT: 241.5 LBS | OXYGEN SATURATION: 98 % | RESPIRATION RATE: 20 BRPM | HEART RATE: 90 BPM | HEIGHT: 72 IN | DIASTOLIC BLOOD PRESSURE: 70 MMHG | SYSTOLIC BLOOD PRESSURE: 146 MMHG | TEMPERATURE: 98 F

## 2019-11-13 DIAGNOSIS — R42 DIZZINESS AND GIDDINESS: Primary | ICD-10-CM

## 2019-11-13 DIAGNOSIS — I10 ESSENTIAL HYPERTENSION: ICD-10-CM

## 2019-11-13 PROCEDURE — 99213 OFFICE O/P EST LOW 20 MIN: CPT | Performed by: PHYSICIAN ASSISTANT

## 2019-11-13 NOTE — PATIENT INSTRUCTIONS
Normal physical examination  Blood pressure 146/70 on this visit  The remainder of the vitals were normal   Recommend purchasing a blood pressure machine and monitor and log blood pressure he  Follow-up with primary care physician to establish if antihypertensive medication is needed  Proceed to  ER if symptoms worsen  Hypertension   AMBULATORY CARE:   Hypertension  is high blood pressure (BP)  Your BP is the force of your blood moving against the walls of your arteries  Normal BP is less than 120/80  Prehypertension is between 120/80 and 139/89  Hypertension is 140/90 or higher  Hypertension causes your BP to get so high that your heart has to work much harder than normal  This can damage your heart  You can control hypertension with a healthy lifestyle or medicines  A controlled blood pressure helps protect your organs, such as your heart, lungs, brain, and kidneys  Common symptoms include the following:   · Headache     · Blurred vision     · Chest pain     · Dizziness or weakness     · Trouble breathing    · Nosebleeds  Call 911 for any of the following:   · You have discomfort in your chest that feels like squeezing, pressure, fullness, or pain  · You become confused or have difficulty speaking  · You suddenly feel lightheaded or have trouble breathing  · You have pain or discomfort in your back, neck, jaw, stomach, or arm  Seek care immediately if:   · You have a severe headache or vision loss  · You have weakness in an arm or leg  Contact your healthcare provider if:   · You feel faint, dizzy, confused, or drowsy  · You have been taking your BP medicine and your BP is still higher than your healthcare provider says it should be  · You have questions or concerns about your condition or care  Treatment for hypertension  may include medicine to lower your BP and lower your cholesterol level   A low cholesterol level helps prevent heart disease and makes it easier to control your blood pressure  You may also need to make lifestyle changes  Take your medicine exactly as directed  Manage hypertension:  Talk with your healthcare provider about these and other ways to manage hypertension:  · Check your BP at home  Sit and rest for 5 minutes before you take your BP  Extend your arm and support it on a flat surface  Your arm should be at the same level as your heart  Follow the directions that came with your BP monitor  If possible, take at least 2 BP readings each time  Take your BP at least twice a day at the same times each day, such as morning and evening  Keep a record of your BP readings and bring it to your follow-up visits  Ask your healthcare provider what your BP should be  · Limit sodium (salt) as directed  Too much sodium can affect your fluid balance  Check labels to find low-sodium or no-salt-added foods  Some low-sodium foods use potassium salts for flavor  Too much potassium can also cause health problems  Your healthcare provider will tell you how much sodium and potassium are safe for you to have in a day  He or she may recommend that you limit sodium to 2,300 mg a day  · Follow the meal plan recommended by your healthcare provider  A dietitian or your provider can give you more information on low-sodium plans or the DASH (Dietary Approaches to Stop Hypertension) eating plan  The DASH plan is low in sodium, unhealthy fats, and total fat  It is high in potassium, calcium, and fiber  · Exercise to maintain a healthy weight  Exercise at least 30 minutes per day, on most days of the week  This will help decrease your blood pressure  Ask your healthcare provider about the best exercise plan for you  · Decrease stress  This may help lower your BP  Learn ways to relax, such as deep breathing or listening to music  · Limit alcohol  Women should limit alcohol to 1 drink a day  Men should limit alcohol to 2 drinks a day   A drink of alcohol is 12 ounces of beer, 5 ounces of wine, or 1½ ounces of liquor  · Do not smoke  Nicotine and other chemicals in cigarettes and cigars can increase your BP and also cause lung damage  Ask your healthcare provider for information if you currently smoke and need help to quit  E-cigarettes or smokeless tobacco still contain nicotine  Talk to your healthcare provider before you use these products  · Manage any other health conditions you have  Health conditions such as diabetes can increase your risk for hypertension  Follow your healthcare provider's instructions and take all your medicines as directed  Follow up with your healthcare provider as directed: You will need to return to have your BP checked and to have other lab tests done  Write down your questions so you remember to ask them during your visits  © 2017 [employer] Information is for End User's use only and may not be sold, redistributed or otherwise used for commercial purposes  All illustrations and images included in CareNotes® are the copyrighted property of A D A M [employer] Inc  or [employer]  The above information is an  only  It is not intended as medical advice for individual conditions or treatments  Talk to your doctor, nurse or pharmacist before following any medical regimen to see if it is safe and effective for you

## 2019-11-13 NOTE — PROGRESS NOTES
[facility]        NAME: Dashawn Ortiz is a 45 y o  male  : 1975    MRN: [id number]  DATE: 2019  TIME: 11:41 AM    Assessment and Plan   Dizziness and giddiness [R42]  1  Dizziness and giddiness     2  Essential hypertension           Patient Instructions   Normal physical examination  Blood pressure 146/70 on this visit  The remainder of the vitals were normal   Recommend purchasing a blood pressure machine and monitor and log blood pressure he  Follow-up with primary care physician to establish if antihypertensive medication is needed  Proceed to  ER if symptoms worsen  Chief Complaint     Chief Complaint   Patient presents with    Dizziness     and head pressure behind left eye  History of Present Illness   The patient is a 61-year-old male who presents with dizziness and a headache for approximately 1 week  He has no past medical history  He states that he took his blood pressure at a store and it was elevated  He has had dizziness off and on that is not related to change position change  He does not have a blood pressure cuff at home and is unsure if his blood pressure fluctuates  He denies syncopal events  He does developed a headache that most times is located behind the left eye  He denies vision changes  Negative blurred vision or loss of vision in 1 eye  Negative tearing or drainage from the left eye  Negative aura or history of migraines or cluster headaches  Negative focal deficits  Negative weakness  No speech changes  Negative ambulatory dysfunction  No upper respiratory symptoms including negative nasal congestion or sinus pressure and pain  No recent weight loss or gain  HPI    Review of Systems   Review of Systems   Constitutional: Negative for activity change, chills, fatigue and fever     HENT: Negative for congestion, ear discharge, ear pain, facial swelling, mouth sores, postnasal drip, rhinorrhea, sinus pressure, sinus pain, sneezing, sore throat and trouble swallowing  Eyes: Negative for pain, discharge, redness and itching  Respiratory: Negative for apnea, cough, chest tightness, shortness of breath, wheezing and stridor  Cardiovascular: Negative for chest pain  Gastrointestinal: Negative for abdominal distention, abdominal pain, diarrhea, nausea and vomiting  Genitourinary: Negative for difficulty urinating  Musculoskeletal: Negative for arthralgias and myalgias  Skin: Negative for pallor and rash  Allergic/Immunologic: Negative  Neurological: Positive for dizziness and headaches  Negative for tremors, seizures, syncope, facial asymmetry, speech difficulty, weakness, light-headedness and numbness  Hematological: Negative  Does not bruise/bleed easily  Psychiatric/Behavioral: Negative  All other systems reviewed and are negative  Current Medications       Current Outpatient Medications:     albuterol (VENTOLIN HFA) 90 mcg/act inhaler, Inhale 1-2 puffs, Disp: , Rfl:     Mometasone Furo-Formoterol Fum (DULERA) 200-5 MCG/ACT AERO, Inhale 2 puffs 2 (two) times a day, Disp: , Rfl:     montelukast (SINGULAIR) 10 mg tablet, Take 10 mg by mouth daily at bedtime  , Disp: , Rfl:     ondansetron (ZOFRAN-ODT) 4 mg disintegrating tablet, Take 1 tablet (4 mg total) by mouth every 6 (six) hours as needed for nausea or vomiting (Patient not taking: Reported on 11/13/2019), Disp: 20 tablet, Rfl: 0    Current Allergies     Allergies as of 11/13/2019 - Reviewed 11/13/2019   Allergen Reaction Noted    Pollen extract Other (See Comments) 10/30/2014            The following portions of the patient's history were reviewed and updated as appropriate: allergies, current medications, past family history, past medical history, past social history, past surgical history and problem list      Past Medical History:   Diagnosis Date    Asthma        Past Surgical History:   Procedure Laterality Date    THUMB AMPUTATION Left 09/2015       History reviewed  No pertinent family history  Medications have been verified  Objective   /70   Pulse 90   Temp 98 °F (36 7 °C)   Resp 20   Ht 6' (1 829 m)   Wt 110 kg (241 lb 8 oz)   SpO2 98%   BMI 32 75 kg/m²        Physical Exam     Physical Exam   Constitutional: He is oriented to person, place, and time  Vital signs are normal  He appears well-developed and well-nourished  Non-toxic appearance  He does not have a sickly appearance  He does not appear ill  No distress  HENT:   Head: Normocephalic and atraumatic  Right Ear: Hearing, tympanic membrane, external ear and ear canal normal  No drainage or tenderness  Tympanic membrane is not perforated, not erythematous, not retracted and not bulging  No middle ear effusion  No decreased hearing is noted  Left Ear: Hearing, tympanic membrane, external ear and ear canal normal  No drainage or tenderness  Tympanic membrane is not perforated, not erythematous, not retracted and not bulging  No middle ear effusion  No decreased hearing is noted  Nose: Nose normal  No mucosal edema, rhinorrhea or septal deviation  Right sinus exhibits no maxillary sinus tenderness and no frontal sinus tenderness  Left sinus exhibits no maxillary sinus tenderness and no frontal sinus tenderness  Mouth/Throat: Uvula is midline, oropharynx is clear and moist and mucous membranes are normal  No oral lesions  No dental abscesses or uvula swelling  No oropharyngeal exudate, posterior oropharyngeal edema, posterior oropharyngeal erythema or tonsillar abscesses  Eyes: Pupils are equal, round, and reactive to light  Conjunctivae and EOM are normal  Right eye exhibits no chemosis, no discharge, no exudate and no hordeolum  No foreign body present in the right eye  Left eye exhibits no chemosis, no discharge, no exudate and no hordeolum  No foreign body present in the left eye  Right conjunctiva is not injected   Right conjunctiva has no hemorrhage  Left conjunctiva is not injected  Left conjunctiva has no hemorrhage  No scleral icterus  Right eye exhibits normal extraocular motion and no nystagmus  Left eye exhibits normal extraocular motion and no nystagmus  Right pupil is round and reactive  Left pupil is round and reactive  Pupils are equal    Neck: Trachea normal, normal range of motion and full passive range of motion without pain  Neck supple  No JVD present  No edema and no erythema present  No thyromegaly present  Cardiovascular: Normal rate, regular rhythm, S1 normal, S2 normal, normal heart sounds, intact distal pulses and normal pulses  No murmur heard  Pulmonary/Chest: Effort normal and breath sounds normal  No accessory muscle usage or stridor  No respiratory distress  He has no wheezes  Abdominal: Soft  Normal appearance and bowel sounds are normal  He exhibits no distension  There is no hepatosplenomegaly  There is no tenderness  Musculoskeletal: Normal range of motion  He exhibits no edema  Neurological: He is alert and oriented to person, place, and time  He has normal strength  He is not disoriented  He displays no atrophy and no tremor  No cranial nerve deficit or sensory deficit  He exhibits normal muscle tone  He displays a negative Romberg sign  He displays no seizure activity  Coordination and gait normal  GCS eye subscore is 4  GCS verbal subscore is 5  GCS motor subscore is 6  Skin: Skin is warm, dry and intact  No abrasion, no lesion and no rash noted  He is not diaphoretic  No cyanosis or erythema  Nails show no clubbing  Psychiatric: He has a normal mood and affect  His speech is normal and behavior is normal    Nursing note and vitals reviewed

## 2021-03-19 ENCOUNTER — APPOINTMENT (OUTPATIENT)
Dept: MRI IMAGING | Facility: HOSPITAL | Age: 46
End: 2021-03-19
Payer: COMMERCIAL

## 2021-03-19 ENCOUNTER — HOSPITAL ENCOUNTER (OUTPATIENT)
Facility: HOSPITAL | Age: 46
Setting detail: OBSERVATION
Discharge: HOME | End: 2021-03-21
Attending: EMERGENCY MEDICINE | Admitting: INTERNAL MEDICINE
Payer: COMMERCIAL

## 2021-03-19 ENCOUNTER — TELEPHONE (OUTPATIENT)
Dept: MRI IMAGING | Facility: HOSPITAL | Age: 46
End: 2021-03-19

## 2021-03-19 ENCOUNTER — APPOINTMENT (EMERGENCY)
Dept: CT IMAGING | Facility: HOSPITAL | Age: 46
End: 2021-03-19
Payer: COMMERCIAL

## 2021-03-19 DIAGNOSIS — I16.0 HYPERTENSIVE URGENCY: ICD-10-CM

## 2021-03-19 DIAGNOSIS — R51.9 HEADACHE: Primary | ICD-10-CM

## 2021-03-19 PROBLEM — E87.6 HYPOKALEMIA: Status: ACTIVE | Noted: 2021-03-19

## 2021-03-19 PROBLEM — R93.89 ABNORMAL CT SCAN: Status: ACTIVE | Noted: 2021-03-19

## 2021-03-19 LAB
ALBUMIN SERPL BCP-MCNC: 4.1 G/DL (ref 3.5–5)
ALP SERPL-CCNC: 65 U/L (ref 46–116)
ALT SERPL W P-5'-P-CCNC: 46 U/L (ref 12–78)
ANION GAP SERPL CALCULATED.3IONS-SCNC: 8 MMOL/L (ref 4–13)
APTT PPP: 25 SECONDS (ref 23–37)
AST SERPL W P-5'-P-CCNC: 25 U/L (ref 5–45)
ATRIAL RATE: 97 BPM
BASOPHILS # BLD AUTO: 0.05 THOUSANDS/ΜL (ref 0–0.1)
BASOPHILS NFR BLD AUTO: 1 % (ref 0–1)
BILIRUB SERPL-MCNC: 0.73 MG/DL (ref 0.2–1)
BUN SERPL-MCNC: 11 MG/DL (ref 5–25)
CALCIUM SERPL-MCNC: 8.5 MG/DL (ref 8.3–10.1)
CHLORIDE SERPL-SCNC: 107 MMOL/L (ref 100–108)
CO2 SERPL-SCNC: 27 MMOL/L (ref 21–32)
CREAT SERPL-MCNC: 1.28 MG/DL (ref 0.6–1.3)
EOSINOPHIL # BLD AUTO: 0.12 THOUSAND/ΜL (ref 0–0.61)
EOSINOPHIL NFR BLD AUTO: 2 % (ref 0–6)
ERYTHROCYTE [DISTWIDTH] IN BLOOD BY AUTOMATED COUNT: 12.9 % (ref 11.6–15.1)
GFR SERPL CREATININE-BSD FRML MDRD: 68 ML/MIN/1.73SQ M
GLUCOSE SERPL-MCNC: 98 MG/DL (ref 65–140)
HCT VFR BLD AUTO: 49.3 % (ref 36.5–49.3)
HGB BLD-MCNC: 16.9 G/DL (ref 12–17)
IMM GRANULOCYTES # BLD AUTO: 0.04 THOUSAND/UL (ref 0–0.2)
IMM GRANULOCYTES NFR BLD AUTO: 1 % (ref 0–2)
INR PPP: 0.94 (ref 0.84–1.19)
LYMPHOCYTES # BLD AUTO: 1.54 THOUSANDS/ΜL (ref 0.6–4.47)
LYMPHOCYTES NFR BLD AUTO: 27 % (ref 14–44)
MCH RBC QN AUTO: 31.1 PG (ref 26.8–34.3)
MCHC RBC AUTO-ENTMCNC: 34.3 G/DL (ref 31.4–37.4)
MCV RBC AUTO: 91 FL (ref 82–98)
MONOCYTES # BLD AUTO: 0.43 THOUSAND/ΜL (ref 0.17–1.22)
MONOCYTES NFR BLD AUTO: 8 % (ref 4–12)
NEUTROPHILS # BLD AUTO: 3.54 THOUSANDS/ΜL (ref 1.85–7.62)
NEUTS SEG NFR BLD AUTO: 61 % (ref 43–75)
NRBC BLD AUTO-RTO: 0 /100 WBCS
P AXIS: 45 DEGREES
PLATELET # BLD AUTO: 182 THOUSANDS/UL (ref 149–390)
PMV BLD AUTO: 10.4 FL (ref 8.9–12.7)
POTASSIUM SERPL-SCNC: 3.3 MMOL/L (ref 3.5–5.3)
PR INTERVAL: 186 MS
PROT SERPL-MCNC: 7.5 G/DL (ref 6.4–8.2)
PROTHROMBIN TIME: 12.7 SECONDS (ref 11.6–14.5)
QRS AXIS: 12 DEGREES
QRSD INTERVAL: 96 MS
QT INTERVAL: 348 MS
QTC INTERVAL: 441 MS
RBC # BLD AUTO: 5.43 MILLION/UL (ref 3.88–5.62)
SODIUM SERPL-SCNC: 142 MMOL/L (ref 136–145)
T WAVE AXIS: 36 DEGREES
TROPONIN I SERPL-MCNC: <0.02 NG/ML
VENTRICULAR RATE: 97 BPM
WBC # BLD AUTO: 5.72 THOUSAND/UL (ref 4.31–10.16)

## 2021-03-19 PROCEDURE — 80053 COMPREHEN METABOLIC PANEL: CPT | Performed by: EMERGENCY MEDICINE

## 2021-03-19 PROCEDURE — 70498 CT ANGIOGRAPHY NECK: CPT

## 2021-03-19 PROCEDURE — 99219 PR INITIAL OBSERVATION CARE/DAY 50 MINUTES: CPT | Performed by: INTERNAL MEDICINE

## 2021-03-19 PROCEDURE — 96374 THER/PROPH/DIAG INJ IV PUSH: CPT

## 2021-03-19 PROCEDURE — 83036 HEMOGLOBIN GLYCOSYLATED A1C: CPT | Performed by: PHYSICIAN ASSISTANT

## 2021-03-19 PROCEDURE — 93010 ELECTROCARDIOGRAM REPORT: CPT | Performed by: INTERNAL MEDICINE

## 2021-03-19 PROCEDURE — 85730 THROMBOPLASTIN TIME PARTIAL: CPT | Performed by: EMERGENCY MEDICINE

## 2021-03-19 PROCEDURE — 93005 ELECTROCARDIOGRAM TRACING: CPT

## 2021-03-19 PROCEDURE — 80061 LIPID PANEL: CPT | Performed by: PHYSICIAN ASSISTANT

## 2021-03-19 PROCEDURE — 70496 CT ANGIOGRAPHY HEAD: CPT

## 2021-03-19 PROCEDURE — 99285 EMERGENCY DEPT VISIT HI MDM: CPT | Performed by: EMERGENCY MEDICINE

## 2021-03-19 PROCEDURE — 96361 HYDRATE IV INFUSION ADD-ON: CPT

## 2021-03-19 PROCEDURE — 85610 PROTHROMBIN TIME: CPT | Performed by: EMERGENCY MEDICINE

## 2021-03-19 PROCEDURE — 36415 COLL VENOUS BLD VENIPUNCTURE: CPT | Performed by: EMERGENCY MEDICINE

## 2021-03-19 PROCEDURE — 85025 COMPLETE CBC W/AUTO DIFF WBC: CPT | Performed by: EMERGENCY MEDICINE

## 2021-03-19 PROCEDURE — 99285 EMERGENCY DEPT VISIT HI MDM: CPT

## 2021-03-19 PROCEDURE — 84484 ASSAY OF TROPONIN QUANT: CPT | Performed by: EMERGENCY MEDICINE

## 2021-03-19 RX ORDER — LABETALOL 20 MG/4 ML (5 MG/ML) INTRAVENOUS SYRINGE
10 EVERY 6 HOURS PRN
Status: DISCONTINUED | OUTPATIENT
Start: 2021-03-19 | End: 2021-03-20

## 2021-03-19 RX ORDER — LABETALOL 20 MG/4 ML (5 MG/ML) INTRAVENOUS SYRINGE
10 ONCE
Status: COMPLETED | OUTPATIENT
Start: 2021-03-19 | End: 2021-03-19

## 2021-03-19 RX ORDER — AMLODIPINE BESYLATE 10 MG/1
10 TABLET ORAL DAILY
Status: DISCONTINUED | OUTPATIENT
Start: 2021-03-19 | End: 2021-03-21 | Stop reason: HOSPADM

## 2021-03-19 RX ORDER — LORAZEPAM 0.5 MG/1
0.5 TABLET ORAL EVERY 8 HOURS PRN
Status: DISCONTINUED | OUTPATIENT
Start: 2021-03-19 | End: 2021-03-20

## 2021-03-19 RX ORDER — POTASSIUM CHLORIDE 14.9 MG/ML
20 INJECTION INTRAVENOUS ONCE
Status: COMPLETED | OUTPATIENT
Start: 2021-03-19 | End: 2021-03-20

## 2021-03-19 RX ADMIN — LABETALOL 20 MG/4 ML (5 MG/ML) INTRAVENOUS SYRINGE 10 MG: at 13:58

## 2021-03-19 RX ADMIN — AMLODIPINE BESYLATE 10 MG: 10 TABLET ORAL at 16:17

## 2021-03-19 RX ADMIN — IOHEXOL 85 ML: 350 INJECTION, SOLUTION INTRAVENOUS at 12:32

## 2021-03-19 RX ADMIN — POTASSIUM CHLORIDE 20 MEQ: 14.9 INJECTION, SOLUTION INTRAVENOUS at 15:57

## 2021-03-19 RX ADMIN — LABETALOL 20 MG/4 ML (5 MG/ML) INTRAVENOUS SYRINGE 10 MG: at 12:10

## 2021-03-19 RX ADMIN — SODIUM CHLORIDE 1000 ML: 0.9 INJECTION, SOLUTION INTRAVENOUS at 11:21

## 2021-03-19 NOTE — H&P
[address] [place] [address]  H&P- Uday Ortiz 1975, 42 y o  male MRN: [id number]  Unit/Bed#: ED 18 Encounter: [id number]  Primary Care Provider: Adriana Keller MD   Date and time admitted to hospital: 3/19/2021 10:43 AM    * Hypertensive urgency  Assessment & Plan  Associated with headache  CT angiogram no intracranial abnormalities  Blood pressure was 806 systolic on admission - would not bring down more than 25% in the 1st 24 hours currently blood pressure is 446 systolic  Will and amlodipine  To regular meds starting today continue with p r n  Occipital headache  Assessment & Plan  Initially associated with dizziness  Now still "dizzy" - but improved  Will get MRI  Neuro check  If MRI is negative then patient should be able to be discharged once blood pressures are better controlled    Abnormal CT scan  Assessment & Plan  CT scan -     "1   No acute intracranial CT abnormality  2   Unremarkable CT angiogram of the head and neck   No dissection  3   Periapical lucency of left maxillary first molar tooth   Clinical assessment is advised    "  Unclear what the significance of this is follow-up with dentist          Hypokalemia  Assessment & Plan  Potassium is 3 3 will replace will recheck in the morning    Obstructive sleep apnea syndrome  Assessment & Plan  CPAP at night      VTE Prophylaxis: Heparin  / sequential compression device   Code Status:  Full code  POLST: There is no POLST form on file for this patient (pre-hospital)  Discussion with family:  Discussed with the patient he will update his family    Anticipated Length of Stay:  Patient will be admitted on an Observation basis with an anticipated length of stay of  less than 2 midnights  Justification for Hospital Stay:  Hypertensive urgency    Total Time for Visit, including Counseling / Coordination of Care: 45 minutes  Greater than 50% of this total time spent on direct patient counseling and coordination of care      Chief Complaint:   Headache and dizziness    History of Present Illness: Uday Ortiz is a 42 y o  male with no significant medical history presents with the occipital headache which came on 1-2 days ago accompanied by dizziness and some blurry vision in the left eye  This is improved and currently the patient is still feeling slightly dizzy but left eye is back to normal     His blood pressure when he came into the ED was 704 systolic  He did receive labetalol 10 mg x2 in the ED after which his blood pressure came down to 093 systolic  Review of Systems:    Review of Systems   Constitutional: Negative  HENT: Negative  Eyes: Negative  Respiratory: Negative  Cardiovascular: Negative  Gastrointestinal: Negative  Endocrine: Negative  Genitourinary: Negative  Musculoskeletal: Negative  Skin: Negative  Neurological: Positive for dizziness and headaches  Negative for tremors, seizures, syncope, facial asymmetry, speech difficulty, weakness, light-headedness and numbness  Hematological: Negative  Psychiatric/Behavioral: Negative  Past Medical and Surgical History:     Past Medical History:   Diagnosis Date    Asthma        Past Surgical History:   Procedure Laterality Date    THUMB AMPUTATION Left 09/2015       Meds/Allergies:    Prior to Admission medications    Medication Sig Start Date End Date Taking? Authorizing Provider   albuterol (VENTOLIN HFA) 90 mcg/act inhaler Inhale 1-2 puffs    Historical Provider, MD   Mometasone Furo-Formoterol Fum (DULERA) 200-5 MCG/ACT AERO Inhale 2 puffs 2 (two) times a day    Historical Provider, MD   montelukast (SINGULAIR) 10 mg tablet Take 10 mg by mouth daily at bedtime      Historical Provider, MD   ondansetron (ZOFRAN-ODT) 4 mg disintegrating tablet Take 1 tablet (4 mg total) by mouth every 6 (six) hours as needed for nausea or vomiting  Patient not taking: Reported on 11/13/2019 12/17/18   Idalmis Barbosa, DO     I have reviewed home medications with patient personally  Allergies: Allergies   Allergen Reactions    Pollen Extract Other (See Comments)       Social History:     Marital Status: /Civil Union   Occupation:    Patient Pre-hospital Living Situation:  Currently living alone  from his wife  Patient Pre-hospital Level of Mobility:  Fully mobile  Patient Pre-hospital Diet Restrictions:  None  Substance Use History:   Social History     Substance and Sexual Activity   Alcohol Use Yes    Comment: occasionally     Social History     Tobacco Use   Smoking Status Never Smoker   Smokeless Tobacco Never Used     Social History     Substance and Sexual Activity   Drug Use No       Family History:    No family history on file  Physical Exam:     Vitals:   Blood Pressure: (!) 162/107 (03/19/21 1436)  Pulse: 85 (03/19/21 1436)  Temperature: 98 5 °F (36 9 °C) (03/19/21 1046)  Temp Source: Oral (03/19/21 1046)  Respirations: 18 (03/19/21 1436)  Weight - Scale: 107 kg (235 lb 7 2 oz) (03/19/21 1046)  SpO2: 97 % (03/19/21 1436)    Physical Exam  HENT:      Head: Normocephalic  Nose: Nose normal  No congestion or rhinorrhea  Mouth/Throat:      Mouth: Mucous membranes are moist       Pharynx: No oropharyngeal exudate or posterior oropharyngeal erythema  Eyes:      General: No scleral icterus  Right eye: No discharge  Left eye: No discharge  Pupils: Pupils are equal, round, and reactive to light  Cardiovascular:      Rate and Rhythm: Normal rate  Heart sounds: No murmur  No friction rub  No gallop  Pulmonary:      Effort: Pulmonary effort is normal  No respiratory distress  Breath sounds: No stridor  No wheezing, rhonchi or rales  Abdominal:      General: Abdomen is flat  There is no distension  Palpations: There is no mass  Tenderness: There is no abdominal tenderness  There is no right CVA tenderness, left CVA tenderness, guarding or rebound  Hernia: No hernia is present  Skin:     General: Skin is warm  Capillary Refill: Capillary refill takes less than 2 seconds  Coloration: Skin is not jaundiced or pale  Findings: No bruising, erythema, lesion or rash  Neurological:      General: No focal deficit present  Mental Status: He is alert  Cranial Nerves: No cranial nerve deficit  Sensory: No sensory deficit  Motor: No weakness  Coordination: Coordination normal       Gait: Gait normal       Deep Tendon Reflexes: Reflexes normal    Psychiatric:         Mood and Affect: Mood normal            Additional Data:     Lab Results: I have personally reviewed pertinent reports  Results from last 7 days   Lab Units 03/19/21  1120   WBC Thousand/uL 5 72   HEMOGLOBIN g/dL 16 9   HEMATOCRIT % 49 3   PLATELETS Thousands/uL 182   NEUTROS PCT % 61   LYMPHS PCT % 27   MONOS PCT % 8   EOS PCT % 2     Results from last 7 days   Lab Units 03/19/21  1120   SODIUM mmol/L 142   POTASSIUM mmol/L 3 3*   CHLORIDE mmol/L 107   CO2 mmol/L 27   BUN mg/dL 11   CREATININE mg/dL 1 28   ANION GAP mmol/L 8   CALCIUM mg/dL 8 5   ALBUMIN g/dL 4 1   TOTAL BILIRUBIN mg/dL 0 73   ALK PHOS U/L 65   ALT U/L 46   AST U/L 25   GLUCOSE RANDOM mg/dL 98     Results from last 7 days   Lab Units 03/19/21  1120   INR  0 94                   Imaging: I have personally reviewed pertinent reports  CTA head and neck with and without contrast   Final Result by Tonya Leon MD (03/19 1318)      1  No acute intracranial CT abnormality  2   Unremarkable CT angiogram of the head and neck  No dissection  3   Periapical lucency of left maxillary first molar tooth  Clinical assessment is advised                                         Workstation performed: [id number]             EKG, Pathology, and Other Studies Reviewed on Admission:   · EKG:  No EKG on chart    Allscripts / Epic Records Reviewed: Yes     ** Please Note: This note has been constructed using a voice recognition system   **

## 2021-03-19 NOTE — ED PROVIDER NOTES
History  Chief Complaint   Patient presents with    Headache     patient presents today with head pressure behind left ear then started with dizziness this morning felt like was going to pass out  41 y/o male presents today reporting a left sided posterior headache intermittently for a few days  This morning he states the headache was worse, feeling pressure at the base on his head on the left and behind his left ear and he felt like the vision in his left eye was blurry and his left face felt numb  He also reports feeling like he was going to pass out  Did not take anything for his headache  Onset was gradual         History provided by:  Patient  Headache  Pain location:  Occipital  Quality:  Dull  Radiates to: Face  Severity currently:  8/10  Severity at highest:  8/10  Onset quality:  Gradual  Timing:  Constant  Progression:  Waxing and waning  Chronicity:  New  Context comment:  See HPI  Relieved by:  None tried  Worsened by:  Nothing  Ineffective treatments:  None tried  Associated symptoms: numbness    Associated symptoms: no abdominal pain, no back pain, no dizziness, no drainage, no fatigue, no fever and no sore throat        Prior to Admission Medications   Prescriptions Last Dose Informant Patient Reported? Taking? Mometasone Furo-Formoterol Fum (DULERA) 200-5 MCG/ACT AERO   Yes No   Sig: Inhale 2 puffs 2 (two) times a day   albuterol (VENTOLIN HFA) 90 mcg/act inhaler   Yes No   Sig: Inhale 1-2 puffs   montelukast (SINGULAIR) 10 mg tablet   Yes No   Sig: Take 10 mg by mouth daily at bedtime     ondansetron (ZOFRAN-ODT) 4 mg disintegrating tablet   No No   Sig: Take 1 tablet (4 mg total) by mouth every 6 (six) hours as needed for nausea or vomiting   Patient not taking: Reported on 11/13/2019      Facility-Administered Medications: None       Past Medical History:   Diagnosis Date    Asthma        Past Surgical History:   Procedure Laterality Date    THUMB AMPUTATION Left 09/2015       No family history on file  I have reviewed and agree with the history as documented  E-Cigarette/Vaping     E-Cigarette/Vaping Substances     Social History     Tobacco Use    Smoking status: Never Smoker    Smokeless tobacco: Never Used   Substance Use Topics    Alcohol use: Yes     Comment: occasionally    Drug use: No       Review of Systems   Constitutional: Negative for chills, fatigue and fever  HENT: Negative for postnasal drip, sore throat and trouble swallowing  Eyes: Positive for visual disturbance (left eye)  Respiratory: Negative for chest tightness and shortness of breath  Cardiovascular: Negative for leg swelling  Gastrointestinal: Negative for abdominal pain  Genitourinary: Negative for dysuria  Musculoskeletal: Negative for back pain  Skin: Negative for rash  Allergic/Immunologic: Negative for immunocompromised state  Neurological: Positive for light-headedness, numbness and headaches  Negative for dizziness  Psychiatric/Behavioral: Negative for confusion  Physical Exam  Physical Exam  Vitals signs and nursing note reviewed  Constitutional:       Appearance: He is well-developed  HENT:      Head: Normocephalic and atraumatic  Mouth/Throat:      Mouth: Mucous membranes are moist       Pharynx: Uvula midline  Tonsils: No tonsillar exudate  Eyes:      Pupils: Pupils are equal, round, and reactive to light  Neck:      Musculoskeletal: Normal range of motion and neck supple  Cardiovascular:      Rate and Rhythm: Normal rate and regular rhythm  Pulmonary:      Effort: Pulmonary effort is normal       Breath sounds: Normal breath sounds  Abdominal:      General: Bowel sounds are normal       Palpations: Abdomen is soft  Tenderness: There is no abdominal tenderness  There is no guarding or rebound  Musculoskeletal:         General: No tenderness or deformity  Skin:     General: Skin is warm and dry        Capillary Refill: Capillary refill takes less than 2 seconds  Neurological:      General: No focal deficit present  Mental Status: He is alert and oriented to person, place, and time  Comments:  Alert and oriented to person, place, and time  GCS 15  CN II-XII intact  No facial asymmetry noted  Strong eye closure & symmetric brow raise  No nystagmus  Speech is clear and not slurred  No word finding issues  Ability to insufflate cheeks, protrude tongue midline & range this laterally  Symmetric/ intact sensation in the upper, mid & lower portions of the face   Finger to nose intact bilaterally  Strength equal upper extremities b/l  Strength equal in lower extremities b/l  Able to hold extremities against gravity x 10 seconds without drift x 4  No pronator drift    5/5 strength on head turn, shoulder shrug, bicep, tricep & deltoid movement against resistance b/l   5/5 strength on b/l hand , pincer grasp, finger abduction, dorsi & volar flexion, hip flexion, dorsi & plantar flexion  Symmetric grossly intact sensation in the UE & LE   Gait deferred   No dysmetria           Psychiatric:         Mood and Affect: Mood normal          Behavior: Behavior normal          Vital Signs  ED Triage Vitals   Temperature Pulse Respirations Blood Pressure SpO2   03/19/21 1046 03/19/21 1046 03/19/21 1046 03/19/21 1047 03/19/21 1046   98 5 °F (36 9 °C) (!) 107 18 (!) 202/105 98 %      Temp Source Heart Rate Source Patient Position - Orthostatic VS BP Location FiO2 (%)   03/19/21 1046 03/19/21 1046 03/19/21 1046 03/19/21 1046 --   Oral Monitor Lying Right arm       Pain Score       03/19/21 1545       No Pain           Vitals:    03/19/21 1358 03/19/21 1415 03/19/21 1436 03/19/21 1545   BP: (!) 161/106 (!) 167/116 (!) 162/107 (!) 143/127   Pulse: 89 86 85 84   Patient Position - Orthostatic VS: Sitting Sitting Sitting Sitting         Visual Acuity      ED Medications  Medications   potassium chloride 20 mEq IVPB (premix) (20 mEq Intravenous New Bag 3/19/21 1557)   amLODIPine (NORVASC) tablet 10 mg (10 mg Oral Given 3/19/21 1617)   Labetalol HCl (NORMODYNE) injection 10 mg (has no administration in time range)   sodium chloride 0 9 % bolus 1,000 mL (0 mL Intravenous Stopped 3/19/21 1252)   Labetalol HCl (NORMODYNE) injection 10 mg (10 mg Intravenous Given 3/19/21 1210)   iohexol (OMNIPAQUE) 350 MG/ML injection (MULTI-DOSE) 85 mL (85 mL Intravenous Given 3/19/21 1232)   Labetalol HCl (NORMODYNE) injection 10 mg (10 mg Intravenous Given 3/19/21 1358)       Diagnostic Studies  Results Reviewed     Procedure Component Value Units Date/Time    Troponin I [[id number]]  (Normal) Collected: 03/19/21 1210    Lab Status: Final result Specimen: Blood from Arm, Left Updated: 03/19/21 1248     Troponin I <0 02 ng/mL     Comprehensive metabolic panel [[id number]]  (Abnormal) Collected: 03/19/21 1120    Lab Status: Final result Specimen: Blood from Arm, Left Updated: 03/19/21 1205     Sodium 142 mmol/L      Potassium 3 3 mmol/L      Chloride 107 mmol/L      CO2 27 mmol/L      ANION GAP 8 mmol/L      BUN 11 mg/dL      Creatinine 1 28 mg/dL      Glucose 98 mg/dL      Calcium 8 5 mg/dL      AST 25 U/L      ALT 46 U/L      Alkaline Phosphatase 65 U/L      Total Protein 7 5 g/dL      Albumin 4 1 g/dL      Total Bilirubin 0 73 mg/dL      eGFR 68 ml/min/1 73sq m     Narrative:      [place] guidelines for Chronic Kidney Disease (CKD):     Stage 1 with normal or high GFR (GFR > 90 mL/min/1 73 square meters)    Stage 2 Mild CKD (GFR = 60-89 mL/min/1 73 square meters)    Stage 3A Moderate CKD (GFR = 45-59 mL/min/1 73 square meters)    Stage 3B Moderate CKD (GFR = 30-44 mL/min/1 73 square meters)    Stage 4 Severe CKD (GFR = 15-29 mL/min/1 73 square meters)    Stage 5 End Stage CKD (GFR <15 mL/min/1 73 square meters)  Note: GFR calculation is accurate only with a steady state creatinine    Protime-INR [[id number]]  (Normal) Collected: 03/19/21 1120    Lab Status: Final result Specimen: Blood from Arm, Left Updated: 03/19/21 1142     Protime 12 7 seconds      INR 0 94    APTT [[id number]]  (Normal) Collected: 03/19/21 1120    Lab Status: Final result Specimen: Blood from Arm, Left Updated: 03/19/21 1142     PTT 25 seconds     CBC and differential [[id number]] Collected: 03/19/21 1120    Lab Status: Final result Specimen: Blood from Arm, Left Updated: 03/19/21 1130     WBC 5 72 Thousand/uL      RBC 5 43 Million/uL      Hemoglobin 16 9 g/dL      Hematocrit 49 3 %      MCV 91 fL      MCH 31 1 pg      MCHC 34 3 g/dL      RDW 12 9 %      MPV 10 4 fL      Platelets 872 Thousands/uL      nRBC 0 /100 WBCs      Neutrophils Relative 61 %      Immat GRANS % 1 %      Lymphocytes Relative 27 %      Monocytes Relative 8 %      Eosinophils Relative 2 %      Basophils Relative 1 %      Neutrophils Absolute 3 54 Thousands/µL      Immature Grans Absolute 0 04 Thousand/uL      Lymphocytes Absolute 1 54 Thousands/µL      Monocytes Absolute 0 43 Thousand/µL      Eosinophils Absolute 0 12 Thousand/µL      Basophils Absolute 0 05 Thousands/µL                  MRI brain wo contrast    by Meryle Pluck (03/19 1645)      CTA head and neck with and without contrast   Final Result by Gretchen Collazo MD (03/19 1318)      1  No acute intracranial CT abnormality  2   Unremarkable CT angiogram of the head and neck  No dissection  3   Periapical lucency of left maxillary first molar tooth  Clinical assessment is advised  Workstation performed: [id number]                    Procedures  Procedures         ED Course                         Initial Sepsis Screening     Row Name 03/19/21 [phone] 03/19/21 1405 03/19/21 1404          Is the patient's history suggestive of a new or worsening infection?   No  -EP  No  -EP  No  -EP      Suspected source of infection  --  --  --      Are two or more of the following signs & symptoms of infection both present and new to the patient?   No  -EP  --  No  -EP      Indicate SIRS criteria  --  --  --      If the answer is yes to both questions, suspicion of sepsis is present  --  --  --      If severe sepsis is present AND tissue hypoperfusion perists in the hour after fluid resuscitation or lactate > 4, the patient meets criteria for SEPTIC SHOCK  --  --  --      Are any of the following organ dysfunction criteria present within 6 hours of suspected infection and SIRS criteria that are NOT considered to be chronic conditions?  --  --  --      Organ dysfunction  --  --  --      Date of presentation of severe sepsis  --  --  --      Time of presentation of severe sepsis  --  --  --      Tissue hypoperfusion persists in the hour after crystalloid fluid administration, evidenced, by either:  --  --  --      Was hypotension present within one hour of the conclusion of crystalloid fluid administration?  --  --  --      Date of presentation of septic shock  --  --  --      Time of presentation of septic shock  --  --  --        User Key  (r) = Recorded By, (t) = Taken By, (c) = Cosigned By    Initials Name Provider Type    EP Mary Escobar MD Physician           Default Flowsheet Data (last 720 hours)      Sepsis Reassess     Row Name 03/19/21 2491                   Repeat Volume Status and Tissue Perfusion Assessment Performed    Repeat Volume Status and Tissue Perfusion Assessment Performed  --           Volume Status and Tissue Perfusion Post Fluid Resuscitation * Must Document All *    Vital Signs Reviewed (HR, RR, BP, T)  Yes  -EP        Shock Index Reviewed  --        Arterial Oxygen Saturation Reviewed (POx, SaO2 or SpO2)  --        Cardio  --        Pulmonary  --        Capillary Refill  --        Peripheral Pulses  --        Skin  --        Urine output assessed  --           *OR*   Intensive Monitoring- Must Document One of the Following Four *:    Vital Signs Reviewed  --        * Central Venous Pressure (CVP or RAP)  -- * Central Venous Oxygen (SVO2, ScvO2 or Oxygen saturation via central catheter)  --        * Bedside Cardiovascular US in IVC diameter and % collapse  --        * Passive Leg Raise OR Crystalloid Challenge  --          User Key  (r) = Recorded By, (t) = Taken By, (c) = Cosigned By    Initials Name Provider Type    GILBERTO Marcus MD Physician                      MDM  Number of Diagnoses or Management Options  Headache: new and requires workup  Hypertensive urgency: new and requires workup  Diagnosis management comments: 11:23 AM  BP Readings from Last 3 Encounters:  03/19/21 : (!) 202/105  11/13/19 : 146/70  09/26/19 : 149/100    1:47 PM  MDM: Patient presents to the Emergency Department and was diagnosed with acute hypertensive urgency  This is a new problem that will require additional planned work-up in a hospitalized setting  Clinical laboratory testing, radiology imaging, and medical testing (EKG) were ordered  I independently reviewed the radiologic imaging, EKG, and laboratory studies  This case is considered high risk secondary to the above listed disease process that poses a threat to bodily function that requires further diagnostic testing and management which may include the administration of parenteral controlled substances  Discussed with NITO  We had a detailed discussion of the patient's condition and case,  including need for admission  Accepts to his/her service  Bed request/bridging orders placed               Amount and/or Complexity of Data Reviewed  Clinical lab tests: ordered and reviewed  Tests in the radiology section of CPT®: ordered and reviewed  Tests in the medicine section of CPT®: ordered and reviewed  Review and summarize past medical records: yes  Discuss the patient with other providers: yes  Independent visualization of images, tracings, or specimens: yes    Risk of Complications, Morbidity, and/or Mortality  Presenting problems: high  Diagnostic procedures: high  Management options: high    Patient Progress  Patient progress: stable      Disposition  Final diagnoses:   Headache   Hypertensive urgency     Time reflects when diagnosis was documented in both MDM as applicable and the Disposition within this note     Time User Action Codes Description Comment    3/19/2021 12:03 PM Julissa Campos [R51 9] Headache     3/19/2021 12:03 PM Darryl Segundo [I16 0] Hypertensive urgency       ED Disposition     ED Disposition Condition Date/Time Comment    Admit Stable Fri Mar 19, 2021  1:46 PM Case was discussed with NITO and the patient's admission status was agreed to be Admission Status: observation status to the service of Dr Taz Taylor   Follow-up Information    None         Patient's Medications   Discharge Prescriptions    No medications on file     No discharge procedures on file      PDMP Review     None          ED Provider  Electronically Signed by           Kendrick Ty DO  03/19/21 1857

## 2021-03-19 NOTE — ASSESSMENT & PLAN NOTE
Initially associated with dizziness  Now still "dizzy" - but improved  Will get MRI  Neuro check  If MRI is negative then patient should be able to be discharged once blood pressures are better controlled

## 2021-03-19 NOTE — ASSESSMENT & PLAN NOTE
CT scan -     "1   No acute intracranial CT abnormality  2   Unremarkable CT angiogram of the head and neck   No dissection       3   Periapical lucency of left maxillary first molar tooth   Clinical assessment is advised    "  Unclear what the significance of this is follow-up with dentist

## 2021-03-19 NOTE — ED NOTES
Pt unable to complete MRI due to claustrophobia  Dr Kristin Garner aware        Jyoti Jordan, RN  03/19/21 3139

## 2021-03-19 NOTE — ASSESSMENT & PLAN NOTE
Associated with headache  CT angiogram no intracranial abnormalities  Blood pressure was 838 systolic on admission - would not bring down more than 25% in the 1st 24 hours currently blood pressure is 322 systolic  Will and amlodipine  To regular meds starting today continue with p r n

## 2021-03-20 PROCEDURE — 99225 PR SBSQ OBSERVATION CARE/DAY 25 MINUTES: CPT | Performed by: PHYSICIAN ASSISTANT

## 2021-03-20 RX ORDER — ONDANSETRON 2 MG/ML
4 INJECTION INTRAMUSCULAR; INTRAVENOUS EVERY 4 HOURS PRN
Status: DISCONTINUED | OUTPATIENT
Start: 2021-03-20 | End: 2021-03-21 | Stop reason: HOSPADM

## 2021-03-20 RX ORDER — HYDRALAZINE HYDROCHLORIDE 20 MG/ML
5 INJECTION INTRAMUSCULAR; INTRAVENOUS EVERY 6 HOURS PRN
Status: DISCONTINUED | OUTPATIENT
Start: 2021-03-20 | End: 2021-03-21 | Stop reason: HOSPADM

## 2021-03-20 RX ORDER — LORAZEPAM 2 MG/ML
0.5 INJECTION INTRAMUSCULAR ONCE AS NEEDED
Status: DISCONTINUED | OUTPATIENT
Start: 2021-03-20 | End: 2021-03-21 | Stop reason: HOSPADM

## 2021-03-20 RX ORDER — ACETAMINOPHEN 325 MG/1
650 TABLET ORAL EVERY 6 HOURS PRN
Status: DISCONTINUED | OUTPATIENT
Start: 2021-03-20 | End: 2021-03-21 | Stop reason: HOSPADM

## 2021-03-20 RX ADMIN — AMLODIPINE BESYLATE 10 MG: 10 TABLET ORAL at 08:25

## 2021-03-20 RX ADMIN — ACETAMINOPHEN 650 MG: 325 TABLET, FILM COATED ORAL at 11:55

## 2021-03-20 NOTE — DISCHARGE INSTR - AVS FIRST PAGE
Dear Keya Ortiz,     It was our pleasure to care for you here at [facility]  It is our hope that we were always able to exceed the expected standards for your care during your stay  You were hospitalized due to hypertensive urgency  You were cared for on the 3rd floor by Ayo Richmond PA-C under the service of Cornelia Julien MD with the [address] [place] [address] [facility] who covers for your primary care physician (PCP), Zia Castillo MD, while you were hospitalized  If you have any questions or concerns related to this hospitalization, you may contact us at [phone]  For follow up as well as any medication refills, we recommend that you follow up with your primary care physician  A registered nurse will reach out to you by phone within a few days after your discharge to answer any additional questions that you may have after going home  However, at this time we provide for you here, the most important instructions / recommendations at discharge:     · Notable Medication Adjustments -   · Start amlodipine 10 mg daily  · Testing Required after Discharge -   · BP check  · Discuss MRI with Dr Zayda Nunez  · Important follow up information -   · Keep a log of your blood pressures  Check these daily  If your blood pressures greater than 170 or less than 110, call your doctor immediately  · Follow-up with a dentist   Your tooth looked slightly abnormal on your CT scan  · Other Instructions -   · If you develop blurred vision, dizziness, lightheadedness, numbness or tingling, please come to the emergency department immediately  · Please review this entire after visit summary as additional general instructions including medication list, appointments, activity, diet, any pertinent wound care, and other additional recommendations from your care team that may be provided for you      Sincerely,   Ayo Richmond PA-C

## 2021-03-20 NOTE — DISCHARGE SUMMARY
Discharge Summary - [facility]    Patient Information: Jeff Ortiz 42 y o  male MRN: [id number]  Unit/Bed#: S -01 Encounter: [id number]    Discharging Physician / Practitioner: Kevan Miranda PA-C  PCP: Barber Medrano MD  Admission Date:   Admission Orders (From admission, onward)     Ordered        03/19/21 1346  Place in Observation  Once                   Discharge Date: 03/21/21    Reason for Admission: Hypertensive urgency    Discharge Diagnoses:     Principal Problem:    Hypertensive urgency  Active Problems:    Occipital headache    Abnormal CT scan    Hypokalemia    Obstructive sleep apnea syndrome  Resolved Problems:    * No resolved hospital problems  *      Consultations During Hospital Stay:  · none    Procedures Performed:     · none    Significant Findings / Test Results:     · CT head: No acute intracranial CT abnormality  Unremarkable CT angiogram of the head and neck  No dissection  Periapical lucency of left maxillary first molar tooth  Clinical assessment is advised  Incidental Findings:   · Periapical lucency of left maxillary first molar tooth    Test Results Pending at Discharge (will require follow up):   · A1c     Outpatient Tests Requested:  · BP check    Complications:  none    Hospital Course: Jeff Ortiz is a 42 y o  male patient who originally presented to the hospital on 3/19/2021 due to headache and dizziness  Patient reports that he has no significant past medical history  He does not check his blood pressure routinely  He presented to emergency department with a posterior headache as well as the sudden onset of dizziness and blurred vision in the left eye which lasted for a few seconds and then spontaneously resolved  In the emergency department patient's blood pressure was noted to be 937 and to systolic and received IV labetalol x2 with improvement in blood pressure to 463 systolic    With improvement in blood pressure control the patient's headache was noted to significantly improved  He had no further dizziness or vision changes  He was neurologically intact with no deficits throughout his entire hospital stay  Patient was noted to be admitted, started on Norvasc and recommended for MRI however given significant claustrophobia he could not have this performed  We discussed the possibility of using Ativan a prior to MRI however the patient did not wish to do this  Given the patient's symptoms only lasted for a few seconds and then spontaneously resolved in the setting of hypertensive urgency, do not believe this represents acute intracranial or ischemic process and therefore will defer MRI  Will follow-up with the PCP and discuss whether an outpatient open air MRI could be performed  The patient's symptoms have substantially improved with improvement in blood pressure  Condition at Discharge: stable     Discharge Day Visit / Exam:     Subjective:  Feeling well  Minimal headache  No further dizziness  No blurred vision  Vitals: Blood Pressure: 150/100 (03/21/21 0900)  Pulse: 89 (03/21/21 0900)  Temperature: (!) 97 4 °F (36 3 °C) (03/21/21 0727)  Temp Source: Oral (03/21/21 0727)  Respirations: 18 (03/21/21 0727)  Height: 6' (182 9 cm) (03/19/21 2100)  Weight - Scale: 101 kg (222 lb 14 2 oz)(This is the number on the scale) (03/21/21 0600)  SpO2: 97 % (03/21/21 0727)  Exam:   Physical Exam  Vitals signs and nursing note reviewed  Constitutional:       General: He is not in acute distress  Appearance: Normal appearance  He is not diaphoretic  HENT:      Head: Normocephalic and atraumatic  Mouth/Throat:      Mouth: Mucous membranes are moist    Cardiovascular:      Rate and Rhythm: Normal rate and regular rhythm  Pulmonary:      Effort: Pulmonary effort is normal       Breath sounds: Normal breath sounds  No stridor  No wheezing, rhonchi or rales     Abdominal:      General: Bowel sounds are normal       Palpations: Abdomen is soft  There is no mass  Tenderness: There is no abdominal tenderness  There is no guarding  Musculoskeletal:      Right lower leg: No edema  Left lower leg: No edema  Skin:     General: Skin is warm and dry  Neurological:      Mental Status: He is alert  Comments: Awake, alert, oriented x3  Speech clear without dysarthria  Follows commands  Muscle strength 5/5 in all 4 extremities  Finger-to-nose intact bilaterally without over shooting  Heel to shin noted to be intact  Extraocular movements intact without nystagmus, strabismus or lid lag  Psychiatric:         Mood and Affect: Mood normal          Behavior: Behavior normal          Discussion with Family: patient    Discharge instructions/Information to patient and family:   See after visit summary for information provided to patient and family  Provisions for Follow-Up Care:  See after visit summary for information related to follow-up care and any pertinent home health orders  Disposition:     Home    For Discharges to [facility]:   · Not Applicable to this Patient - Not Applicable to this Patient    Planned Readmission: no     Discharge Statement:  I spent 40 minutes discharging the patient  This time was spent on the day of discharge  I had direct contact with the patient on the day of discharge  Greater than 50% of the total time was spent examining patient, answering all patient questions, arranging and discussing plan of care with patient as well as directly providing post-discharge instructions  Additional time then spent on discharge activities  Discharge Medications:  See after visit summary for reconciled discharge medications provided to patient and family        ** Please Note: This note has been constructed using a voice recognition system **

## 2021-03-20 NOTE — PLAN OF CARE
Problem: PAIN - ADULT  Goal: Verbalizes/displays adequate comfort level or baseline comfort level  Description: Interventions:  - Encourage patient to monitor pain and request assistance  - Assess pain using appropriate pain scale  - Administer analgesics based on type and severity of pain and evaluate response  - Implement non-pharmacological measures as appropriate and evaluate response  - Consider cultural and social influences on pain and pain management  - Notify physician/advanced practitioner if interventions unsuccessful or patient reports new pain  Outcome: Progressing     Problem: SAFETY ADULT  Goal: Patient will remain free of falls  Description: INTERVENTIONS:  - Assess patient frequently for physical needs  -  Identify cognitive and physical deficits and behaviors that affect risk of falls  -  Willard fall precautions as indicated by assessment   - Educate patient/family on patient safety including physical limitations  - Instruct patient to call for assistance with activity based on assessment  - Modify environment to reduce risk of injury  - Consider OT/PT consult to assist with strengthening/mobility  Outcome: Progressing     Problem: NEUROSENSORY - ADULT  Goal: Achieves stable or improved neurological status  Description: INTERVENTIONS  - Monitor and report changes in neurological status  - Monitor vital signs such as temperature, blood pressure, glucose, and any other labs ordered   - Initiate measures to prevent increased intracranial pressure  - Monitor for seizure activity and implement precautions if appropriate      Outcome: Progressing  Goal: Achieves maximal functionality and self care  Description: INTERVENTIONS  - Monitor swallowing and airway patency with patient fatigue and changes in neurological status  - Encourage and assist patient to increase activity and self care     - Encourage visually impaired, hearing impaired and aphasic patients to use assistive/communication devices  Outcome: Progressing     Problem: HEMATOLOGIC - ADULT  Goal: Maintains hematologic stability  Description: INTERVENTIONS  - Assess for signs and symptoms of bleeding or hemorrhage  - Monitor labs  - Administer supportive blood products/factors as ordered and appropriate  Outcome: Progressing

## 2021-03-20 NOTE — ASSESSMENT & PLAN NOTE
· Patient with occipital headache, left eye blurred vision and dizziness  Blurred vision and dizziness have subsequently resolved however headache still remains intermittently  This does improve with Tylenol  · Less likely to be TIA/CVA given the quick onset of symptoms  · Initially unable to tolerate MRI secondary to claustrophobia  Patient is agreeable to trial with Ativan

## 2021-03-20 NOTE — PROGRESS NOTES
[facility]  Progress Note Kristy Ortiz 1975, 42 y o  male MRN: [id number]  Unit/Bed#: S -01 Encounter: [id number]  Primary Care Provider: Summer Rodriguez MD   Date and time admitted to hospital: 3/19/2021 10:43 AM    * Hypertensive urgency  Assessment & Plan  · Associated with headache, dizziness and left eye blurred vision  · CT head and neck without any intracranial abnormalities  · Blood pressure has currently decreased to 055O to 439 systolic at this time  · Continue amlodipine and use p r n  IV hydralazine  · Monitor additional 24 hours to ensure improvement in blood pressures and symptoms  Occipital headache  Assessment & Plan  · Patient with occipital headache, left eye blurred vision and dizziness  Blurred vision and dizziness have subsequently resolved however headache still remains intermittently  This does improve with Tylenol  · Less likely to be TIA/CVA given the quick onset of symptoms  · Initially unable to tolerate MRI secondary to claustrophobia  Patient is agreeable to trial with Ativan  Abnormal CT scan  Assessment & Plan  · Periapical lucency of left maxillary 1st molar tooth  · Follow-up with dentist    Hypokalemia  Assessment & Plan  · Repleted with 20 mEq IV x1 in the emergency department    Obstructive sleep apnea syndrome  Assessment & Plan  · CPAP at night      VTE Pharmacologic Prophylaxis: VTE Score: 1 Low Risk (Score 0-2) - Encourage Ambulation  Patient Centered Rounds: I performed bedside rounds with nursing staff today  Cleola Arts  Discussions with Specialists or Other Care Team Provider: patient    Education and Discussions with Family / Patient: Patient declined call to   Time Spent for Care: 45 minutes  More than 50% of total time spent on counseling and coordination of care as described above      Current Length of Stay: 0 day(s)  Current Patient Status: Observation   Certification Statement: The patient, admitted on an observation basis, will now require > 2 midnight hospital stay due to Obtain MRI, control blood pressure  Discharge Plan: Anticipate discharge tomorrow to home  Code Status: Level 1 - Full Code    Subjective:   Still with headache  No further dizziness or blurred vision  blurred vision dizziness lasted approximately seconds  Objective:     Vitals:   Temp (24hrs), Av 1 °F (36 7 °C), Min:97 7 °F (36 5 °C), Max:98 3 °F (36 8 °C)    Temp:  [97 7 °F (36 5 °C)-98 3 °F (36 8 °C)] 97 7 °F (36 5 °C)  HR:  [83-97] 83  Resp:  [16-18] 17  BP: (142-168)/() 162/108  SpO2:  [96 %-99 %] 99 %  Body mass index is 37 94 kg/m²  Input and Output Summary (last 24 hours): Intake/Output Summary (Last 24 hours) at 3/20/2021 1452  Last data filed at 3/20/2021 1155  Gross per 24 hour   Intake 350 ml   Output 300 ml   Net 50 ml       Physical Exam:   Physical Exam  Vitals signs and nursing note reviewed  Constitutional:       General: He is not in acute distress  Appearance: Normal appearance  He is not diaphoretic  HENT:      Head: Normocephalic and atraumatic  Mouth/Throat:      Mouth: Mucous membranes are moist    Cardiovascular:      Rate and Rhythm: Normal rate and regular rhythm  Pulmonary:      Effort: Pulmonary effort is normal       Breath sounds: Normal breath sounds  No stridor  No wheezing, rhonchi or rales  Abdominal:      General: Bowel sounds are normal       Palpations: Abdomen is soft  There is no mass  Tenderness: There is no abdominal tenderness  There is no guarding  Musculoskeletal:      Right lower leg: No edema  Left lower leg: No edema  Skin:     General: Skin is warm and dry  Neurological:      Mental Status: He is alert  Comments: Awake, alert, oriented x3  Pupils equal round reactive to light  Speech clear without dysarthria  Extraocular movements intact without nystagmus, strabismus or lid lag    Finger-to-nose noted to be intact bilaterally without over shooting  Heel to shin noted to be intact  Psychiatric:         Mood and Affect: Mood normal          Behavior: Behavior normal           Additional Data:     Labs:  Results from last 7 days   Lab Units 03/19/21  1120   WBC Thousand/uL 5 72   HEMOGLOBIN g/dL 16 9   HEMATOCRIT % 49 3   PLATELETS Thousands/uL 182   NEUTROS PCT % 61   LYMPHS PCT % 27   MONOS PCT % 8   EOS PCT % 2     Results from last 7 days   Lab Units 03/19/21  1120   SODIUM mmol/L 142   POTASSIUM mmol/L 3 3*   CHLORIDE mmol/L 107   CO2 mmol/L 27   BUN mg/dL 11   CREATININE mg/dL 1 28   ANION GAP mmol/L 8   CALCIUM mg/dL 8 5   ALBUMIN g/dL 4 1   TOTAL BILIRUBIN mg/dL 0 73   ALK PHOS U/L 65   ALT U/L 46   AST U/L 25   GLUCOSE RANDOM mg/dL 98     Results from last 7 days   Lab Units 03/19/21  1120   INR  0 94                   Lines/Drains:  Invasive Devices     Peripheral Intravenous Line            Peripheral IV 03/19/21 Left Antecubital 1 day                      Imaging: Reviewed radiology reports from this admission including: CT head    Recent Cultures (last 7 days):         Last 24 Hours Medication List:   Current Facility-Administered Medications   Medication Dose Route Frequency Provider Last Rate    acetaminophen  650 mg Oral Q6H PRN Tamia Phillips PA-C      amLODIPine  10 mg Oral Daily Gill Almodovar MD      hydrALAZINE  5 mg Intravenous Q6H PRN Tamia Phillips PA-C      LORazepam  0 5 mg Intravenous Once PRN Tamia Phillips PA-C      ondansetron  4 mg Intravenous Q4H PRN Maikel Avalos PA-C          Today, Patient Was Seen By: Maikel Avalos PA-C    **Please Note: This note may have been constructed using a voice recognition system  **

## 2021-03-20 NOTE — ASSESSMENT & PLAN NOTE
· Associated with headache, dizziness and left eye blurred vision  · CT head and neck without any intracranial abnormalities  · Blood pressure has currently decreased to 833F to 209 systolic at this time  · Continue amlodipine and use p r n  IV hydralazine  · Monitor additional 24 hours to ensure improvement in blood pressures and symptoms

## 2021-03-20 NOTE — UTILIZATION REVIEW
Initial Clinical Review    Admission: Date/Time/Statement:   Admission Orders (From admission, onward)     Ordered        03/19/21 1346  Place in Observation  Once                   Orders Placed This Encounter   Procedures    Place in Observation     Standing Status:   Standing     Number of Occurrences:   1     Order Specific Question:   Level of Care     Answer:   Med Surg [16]     ED Arrival Information     Expected Arrival Acuity Means of Arrival Escorted By Service Admission Type    - 3/19/2021 10:30 Urgent Walk-In Self Hospitalist Urgent    Arrival Complaint    dizziness        Chief Complaint   Patient presents with    Headache     patient presents today with head pressure behind left ear then started with dizziness this morning felt like was going to pass out  Assessment/Plan: 43 yo male with hx of asthma presents to ED from home with occipital HA x 1-2 days with dizziness and blurry vision L eye  L eye blurriness has resolved at [facility] time, but dizziness remains  On exam, BP elevated, pt tachycardic  Pt received IV labetalol x 2 IV in Ed with SBP down to 167  CTA showed no acute intracranial abnormality, , recommend f/u with dentist regarding periapical lucency of left maxillary first molar tooth  Labs show low potassium  Pt admitted as OBS with hypertensive urgency, occipital HA  Plan includes - Adding Amlodipine to home meds , obtain MRI brain, neuro checks, replete potassium and recheck 3/20  If MRI is negative , patient should be able to be discharged once blood pressures are better controlled  Update- 3/19 pm -pt claustrophobic and MRI unable to be completed  3/20  Blurred vision and dizziness resolved, pt remains with intermittent HA that improves with tylenol  Pt agreeable to trial Ativan to complete MRI  BP decreased to 864-483'O systolic, will monitor x 24 hrs to ensure improvement in BP and symptoms  Continue amlodipine and use p r n  IV hydralazine  ED Triage Vitals   Temperature Pulse Respirations Blood Pressure SpO2   03/19/21 1046 03/19/21 1046 03/19/21 1046 03/19/21 1047 03/19/21 1046   98 5 °F (36 9 °C) (!) 107 18 (!) 202/105 98 %      Temp Source Heart Rate Source Patient Position - Orthostatic VS BP Location FiO2 (%)   03/19/21 1046 03/19/21 1046 03/19/21 1046 03/19/21 1046 --   Oral Monitor Lying Right arm       Pain Score       03/19/21 1545       No Pain          Wt Readings from Last 1 Encounters:   03/20/21 127 kg (279 lb 12 2 oz)     Additional Vital Signs:   Date/Time  Temp  Pulse  Resp  BP  MAP (mmHg)  SpO2    03/20/21 0808  97 7 °F (36 5 °C)  83  17  142/70  --  99 %    03/19/21 2314  98 3 °F (36 8 °C)  97  16  150/98  --  96 %    03/19/21 2100  98 3 °F (36 8 °C)  92  17  168/118Abnormal   --  96 %    03/19/21 1545  --  84  18  143/127Abnormal   132  97 %    03/19/21 1436  --  85  18  162/107Abnormal   --  97 %    03/19/21 1415  --  86  18  167/116Abnormal   134  97 %    03/19/21 1358  --  89  18  161/106Abnormal   --  98 %    03/19/21 1315  --  84  18  167/107Abnormal   132  97 %    03/19/21 1215  --  86  18  155/104Abnormal   126  97 %    03/19/21 1200  --  90  18  167/111Abnormal   134  97 %    03/19/21 1123  --  98  18  177/117Abnormal   141  97 %    03/19/21 1047  --  --  --  202/105Abnormal   --  --      Date and Time Eye Opening Best Verbal Response Best Motor Response [employer] Coma Scale Score   03/20/21 0730 4 5 6 15   03/19/21 2008 4 5 6 15   03/19/21 1700 4 5 6 15   03/19/21 1600 4 5 6 15   03/19/21 1500 4 5 6 15   03/19/21 1056 4 5 6 15         Pertinent Labs/Diagnostic Test Results:   3/19 CTA head-1  No acute intracranial CT abnormality    2   Unremarkable CT angiogram of the head and neck  No dissection    3   Periapical lucency of left maxillary first molar tooth  Clinical assessment is advised    3/19 ECG-Normal sinus rhythm  Normal ECG          Results from last 7 days   Lab Units 03/19/21  1120   WBC Thousand/uL 5 72   HEMOGLOBIN g/dL 16 9   HEMATOCRIT % 49 3   PLATELETS Thousands/uL 182   NEUTROS ABS Thousands/µL 3 54         Results from last 7 days   Lab Units 03/19/21  1120   SODIUM mmol/L 142   POTASSIUM mmol/L 3 3*   CHLORIDE mmol/L 107   CO2 mmol/L 27   ANION GAP mmol/L 8   BUN mg/dL 11   CREATININE mg/dL 1 28   EGFR ml/min/1 73sq m 68   CALCIUM mg/dL 8 5     Results from last 7 days   Lab Units 03/19/21  1120   AST U/L 25   ALT U/L 46   ALK PHOS U/L 65   TOTAL PROTEIN g/dL 7 5   ALBUMIN g/dL 4 1   TOTAL BILIRUBIN mg/dL 0 73         Results from last 7 days   Lab Units 03/19/21  1120   GLUCOSE RANDOM mg/dL 98           Results from last 7 days   Lab Units 03/19/21  1210   TROPONIN I ng/mL <0 02         Results from last 7 days   Lab Units 03/19/21  1120   PROTIME seconds 12 7   INR  0 94   PTT seconds 25     ED Treatment:   Medication Administration from 03/19/2021 1030 to 03/19/2021 1818       Date/Time Order Dose Route Action     03/19/2021 1121 sodium chloride 0 9 % bolus 1,000 mL 1,000 mL Intravenous New Bag     03/19/2021 1210 Labetalol HCl (NORMODYNE) injection 10 mg 10 mg Intravenous Given     03/19/2021 1232 iohexol (OMNIPAQUE) 350 MG/ML injection (MULTI-DOSE) 85 mL 85 mL Intravenous Given     03/19/2021 1358 Labetalol HCl (NORMODYNE) injection 10 mg 10 mg Intravenous Given     03/19/2021 1557 potassium chloride 20 mEq IVPB (premix) 20 mEq Intravenous New Bag     03/19/2021 1617 amLODIPine (NORVASC) tablet 10 mg 10 mg Oral Given        Past Medical History:   Diagnosis Date    Asthma      Present on Admission:   Obstructive sleep apnea syndrome      Admitting Diagnosis: Dizziness [R42]  Hypertensive urgency [I16 0]  Headache [R51 9]  Age/Sex: 42 y o  male  Admission Orders:  Scheduled Medications:  amLODIPine, 10 mg, Oral, Daily      Continuous IV Infusions:none     PRN Meds:  acetaminophen, 650 mg, Oral, Q6H PRN x1 3/20    BP q4h   neurochecks q4h      Network Utilization Review Department  ATTENTION: Please call with any questions or concerns to [phone] and carefully listen to the prompts so that you are directed to the right person  All voicemails are confidential   Kiera Duckworth all requests for admission clinical reviews, approved or denied determinations and any other requests to dedicated fax number below belonging to the campus where the patient is receiving treatment   List of dedicated fax numbers for the Facilities:  [address] DENIALS (Administrative/Medical Necessity) [phone]   [address] (Maternity/NICU/Pediatrics) [phone]   [address] John Warren 2334 (  Vipin Partida [address]) [address] [place] [address] [address] [place] [address] [phone]

## 2021-03-21 VITALS
HEART RATE: 89 BPM | TEMPERATURE: 97.4 F | WEIGHT: 222.88 LBS | BODY MASS INDEX: 30.19 KG/M2 | SYSTOLIC BLOOD PRESSURE: 150 MMHG | RESPIRATION RATE: 18 BRPM | DIASTOLIC BLOOD PRESSURE: 100 MMHG | HEIGHT: 72 IN | OXYGEN SATURATION: 97 %

## 2021-03-21 LAB
CHOLEST SERPL-MCNC: 181 MG/DL (ref 50–200)
EST. AVERAGE GLUCOSE BLD GHB EST-MCNC: 103 MG/DL
HBA1C MFR BLD: 5.2 %
HDLC SERPL-MCNC: 66 MG/DL
LDLC SERPL CALC-MCNC: 96 MG/DL (ref 0–100)
TRIGL SERPL-MCNC: 96 MG/DL

## 2021-03-21 PROCEDURE — 99217 PR OBSERVATION CARE DISCHARGE MANAGEMENT: CPT | Performed by: PHYSICIAN ASSISTANT

## 2021-03-21 RX ORDER — AMLODIPINE BESYLATE 10 MG/1
10 TABLET ORAL DAILY
Qty: 30 TABLET | Refills: 0 | Status: SHIPPED | OUTPATIENT
Start: 2021-03-21

## 2021-03-21 RX ADMIN — AMLODIPINE BESYLATE 10 MG: 10 TABLET ORAL at 08:45

## 2021-03-21 NOTE — INCIDENTAL FINDINGS
The following findings require follow up:  Radiographic finding   Finding: Periapical lucency of left maxillary first molar tooth   Follow up required: see a dentist   Follow up should be done within 1 month(s)    Please notify the following clinician to assist with the follow up:  Dentist

## 2021-03-21 NOTE — PLAN OF CARE
Problem: PAIN - ADULT  Goal: Verbalizes/displays adequate comfort level or baseline comfort level  Description: Interventions:  - Encourage patient to monitor pain and request assistance  - Assess pain using appropriate pain scale  - Administer analgesics based on type and severity of pain and evaluate response  - Implement non-pharmacological measures as appropriate and evaluate response  - Consider cultural and social influences on pain and pain management  - Notify physician/advanced practitioner if interventions unsuccessful or patient reports new pain  Outcome: Progressing     Problem: NEUROSENSORY - ADULT  Goal: Achieves stable or improved neurological status  Description: INTERVENTIONS  - Monitor and report changes in neurological status  - Monitor vital signs such as temperature, blood pressure, glucose, and any other labs ordered   - Initiate measures to prevent increased intracranial pressure  - Monitor for seizure activity and implement precautions if appropriate      Outcome: Progressing  Goal: Achieves maximal functionality and self care  Description: INTERVENTIONS  - Monitor swallowing and airway patency with patient fatigue and changes in neurological status  - Encourage and assist patient to increase activity and self care     - Encourage visually impaired, hearing impaired and aphasic patients to use assistive/communication devices  Outcome: Progressing     Problem: HEMATOLOGIC - ADULT  Goal: Maintains hematologic stability  Description: INTERVENTIONS  - Assess for signs and symptoms of bleeding or hemorrhage  - Monitor labs  - Administer supportive blood products/factors as ordered and appropriate  Outcome: Progressing

## 2021-03-21 NOTE — PLAN OF CARE
Problem: PAIN - ADULT  Goal: Verbalizes/displays adequate comfort level or baseline comfort level  Description: Interventions:  - Encourage patient to monitor pain and request assistance  - Assess pain using appropriate pain scale  - Administer analgesics based on type and severity of pain and evaluate response  - Implement non-pharmacological measures as appropriate and evaluate response  - Consider cultural and social influences on pain and pain management  - Notify physician/advanced practitioner if interventions unsuccessful or patient reports new pain  Outcome: Progressing     Problem: SAFETY ADULT  Goal: Patient will remain free of falls  Description: INTERVENTIONS:  - Assess patient frequently for physical needs  -  Identify cognitive and physical deficits and behaviors that affect risk of falls  -  [place] fall precautions as indicated by assessment   - Educate patient/family on patient safety including physical limitations  - Instruct patient to call for assistance with activity based on assessment  - Modify environment to reduce risk of injury  - Consider OT/PT consult to assist with strengthening/mobility  Outcome: Progressing     Problem: NEUROSENSORY - ADULT  Goal: Achieves stable or improved neurological status  Description: INTERVENTIONS  - Monitor and report changes in neurological status  - Monitor vital signs such as temperature, blood pressure, glucose, and any other labs ordered   - Initiate measures to prevent increased intracranial pressure  - Monitor for seizure activity and implement precautions if appropriate      Outcome: Progressing  Goal: Achieves maximal functionality and self care  Description: INTERVENTIONS  - Monitor swallowing and airway patency with patient fatigue and changes in neurological status  - Encourage and assist patient to increase activity and self care     - Encourage visually impaired, hearing impaired and aphasic patients to use assistive/communication devices  Outcome: Progressing     Problem: HEMATOLOGIC - ADULT  Goal: Maintains hematologic stability  Description: INTERVENTIONS  - Assess for signs and symptoms of bleeding or hemorrhage  - Monitor labs  - Administer supportive blood products/factors as ordered and appropriate  Outcome: Progressing

## 2021-04-09 ENCOUNTER — TRANSCRIBE ORDERS (OUTPATIENT)
Dept: ADMINISTRATIVE | Facility: HOSPITAL | Age: 46
End: 2021-04-09

## 2021-04-09 DIAGNOSIS — R00.2 PALPITATIONS: ICD-10-CM

## 2021-04-09 DIAGNOSIS — R31.9 HEMATURIA, UNSPECIFIED: Primary | ICD-10-CM

## 2021-04-09 DIAGNOSIS — I10 HYPERTENSION: ICD-10-CM

## 2021-04-21 ENCOUNTER — HOSPITAL ENCOUNTER (OUTPATIENT)
Dept: NON INVASIVE DIAGNOSTICS | Facility: CLINIC | Age: 46
Discharge: HOME | End: 2021-04-21

## 2021-04-21 DIAGNOSIS — I10 HYPERTENSION: ICD-10-CM

## 2021-04-21 DIAGNOSIS — R00.2 PALPITATIONS: ICD-10-CM

## 2021-05-06 ENCOUNTER — HOSPITAL ENCOUNTER (OUTPATIENT)
Dept: ULTRASOUND IMAGING | Facility: CLINIC | Age: 46
Discharge: HOME | End: 2021-05-06
Payer: COMMERCIAL

## 2021-05-06 DIAGNOSIS — R31.9 HEMATURIA, UNSPECIFIED: ICD-10-CM

## 2021-05-06 PROCEDURE — 76770 US EXAM ABDO BACK WALL COMP: CPT

## 2022-08-28 ENCOUNTER — HOSPITAL ENCOUNTER (EMERGENCY)
Facility: HOSPITAL | Age: 47
Discharge: HOME | End: 2022-08-28
Attending: EMERGENCY MEDICINE
Payer: COMMERCIAL

## 2022-08-28 VITALS
HEART RATE: 73 BPM | TEMPERATURE: 98.6 F | RESPIRATION RATE: 17 BRPM | DIASTOLIC BLOOD PRESSURE: 81 MMHG | OXYGEN SATURATION: 96 % | SYSTOLIC BLOOD PRESSURE: 133 MMHG

## 2022-08-28 DIAGNOSIS — F41.9 ANXIETY: Primary | ICD-10-CM

## 2022-08-28 LAB
ALBUMIN SERPL BCP-MCNC: 4.5 G/DL (ref 3.5–5)
ALP SERPL-CCNC: 55 U/L (ref 34–104)
ALT SERPL W P-5'-P-CCNC: 65 U/L (ref 7–52)
ANION GAP SERPL CALCULATED.3IONS-SCNC: 7 MMOL/L (ref 4–13)
AST SERPL W P-5'-P-CCNC: 40 U/L (ref 13–39)
ATRIAL RATE: 69 BPM
BASOPHILS # BLD AUTO: 0.04 THOUSANDS/ΜL (ref 0–0.1)
BASOPHILS NFR BLD AUTO: 1 % (ref 0–1)
BILIRUB SERPL-MCNC: 0.74 MG/DL (ref 0.2–1)
BUN SERPL-MCNC: 18 MG/DL (ref 5–25)
CALCIUM SERPL-MCNC: 9.6 MG/DL (ref 8.4–10.2)
CARDIAC TROPONIN I PNL SERPL HS: 4 NG/L
CHLORIDE SERPL-SCNC: 103 MMOL/L (ref 96–108)
CO2 SERPL-SCNC: 26 MMOL/L (ref 21–32)
CREAT SERPL-MCNC: 1.14 MG/DL (ref 0.6–1.3)
EOSINOPHIL # BLD AUTO: 0.08 THOUSAND/ΜL (ref 0–0.61)
EOSINOPHIL NFR BLD AUTO: 2 % (ref 0–6)
ERYTHROCYTE [DISTWIDTH] IN BLOOD BY AUTOMATED COUNT: 13.1 % (ref 11.6–15.1)
GFR SERPL CREATININE-BSD FRML MDRD: 77 ML/MIN/1.73SQ M
GLUCOSE SERPL-MCNC: 188 MG/DL (ref 65–140)
HCT VFR BLD AUTO: 43.6 % (ref 36.5–49.3)
HGB BLD-MCNC: 14.9 G/DL (ref 12–17)
IMM GRANULOCYTES # BLD AUTO: 0.02 THOUSAND/UL (ref 0–0.2)
IMM GRANULOCYTES NFR BLD AUTO: 0 % (ref 0–2)
LYMPHOCYTES # BLD AUTO: 0.75 THOUSANDS/ΜL (ref 0.6–4.47)
LYMPHOCYTES NFR BLD AUTO: 14 % (ref 14–44)
MCH RBC QN AUTO: 30.5 PG (ref 26.8–34.3)
MCHC RBC AUTO-ENTMCNC: 34.2 G/DL (ref 31.4–37.4)
MCV RBC AUTO: 89 FL (ref 82–98)
MONOCYTES # BLD AUTO: 0.34 THOUSAND/ΜL (ref 0.17–1.22)
MONOCYTES NFR BLD AUTO: 6 % (ref 4–12)
NEUTROPHILS # BLD AUTO: 4.21 THOUSANDS/ΜL (ref 1.85–7.62)
NEUTS SEG NFR BLD AUTO: 77 % (ref 43–75)
NRBC BLD AUTO-RTO: 0 /100 WBCS
P AXIS: 26 DEGREES
PLATELET # BLD AUTO: 191 THOUSANDS/UL (ref 149–390)
PMV BLD AUTO: 10.5 FL (ref 8.9–12.7)
POTASSIUM SERPL-SCNC: 3.7 MMOL/L (ref 3.5–5.3)
PR INTERVAL: 194 MS
PROT SERPL-MCNC: 7.3 G/DL (ref 6.4–8.4)
QRS AXIS: 8 DEGREES
QRSD INTERVAL: 94 MS
QT INTERVAL: 410 MS
QTC INTERVAL: 439 MS
RBC # BLD AUTO: 4.88 MILLION/UL (ref 3.88–5.62)
SODIUM SERPL-SCNC: 136 MMOL/L (ref 135–147)
T WAVE AXIS: 21 DEGREES
TSH SERPL DL<=0.05 MIU/L-ACNC: 1.25 UIU/ML (ref 0.45–4.5)
VENTRICULAR RATE: 69 BPM
WBC # BLD AUTO: 5.44 THOUSAND/UL (ref 4.31–10.16)

## 2022-08-28 PROCEDURE — 85025 COMPLETE CBC W/AUTO DIFF WBC: CPT | Performed by: PHYSICIAN ASSISTANT

## 2022-08-28 PROCEDURE — 93005 ELECTROCARDIOGRAM TRACING: CPT

## 2022-08-28 PROCEDURE — 36415 COLL VENOUS BLD VENIPUNCTURE: CPT | Performed by: PHYSICIAN ASSISTANT

## 2022-08-28 PROCEDURE — 99285 EMERGENCY DEPT VISIT HI MDM: CPT | Performed by: PHYSICIAN ASSISTANT

## 2022-08-28 PROCEDURE — 84484 ASSAY OF TROPONIN QUANT: CPT | Performed by: PHYSICIAN ASSISTANT

## 2022-08-28 PROCEDURE — 99284 EMERGENCY DEPT VISIT MOD MDM: CPT

## 2022-08-28 PROCEDURE — 84443 ASSAY THYROID STIM HORMONE: CPT | Performed by: PHYSICIAN ASSISTANT

## 2022-08-28 PROCEDURE — 93010 ELECTROCARDIOGRAM REPORT: CPT | Performed by: INTERNAL MEDICINE

## 2022-08-28 PROCEDURE — 80053 COMPREHEN METABOLIC PANEL: CPT | Performed by: PHYSICIAN ASSISTANT

## 2022-08-28 RX ORDER — LORAZEPAM 1 MG/1
1 TABLET ORAL ONCE
Status: COMPLETED | OUTPATIENT
Start: 2022-08-28 | End: 2022-08-28

## 2022-08-28 RX ADMIN — LORAZEPAM 1 MG: 1 TABLET ORAL at 12:07

## 2022-08-28 NOTE — ED PROVIDER NOTES
History  Chief Complaint   Patient presents with    Anxiety    Dizziness     Pt states this AM while at work had sudden onset feeling of "anxiety" states it is "hard to describe"  Pt denies CP/SOB  HX of HTN, denies any past anxiety  Zaheer Sanchez is a 40 yo who presnts to the ED today with a feeling of restlessness  Hx of HTN, takes amlodipine  Patient was sitting at his desk doing paperwork, when suddenly had hot and cold flashes and felt restless  Occurred around 10 am, now still feels it but much reduced  No SI/HI  No fevers  No chest pain  No syncope  No lightheadedness (felt slightly foggy, but not lightheaded/dizzy like when he had high blood pressure)  Patient drinks coffee in the morning  Nothing excessive  No energy drinks  No cocaine or other illicit drug use  Rare alcohol use  No hx of anxiety or panic attacks  No headaches  Prior to Admission Medications   Prescriptions Last Dose Informant Patient Reported? Taking? albuterol (VENTOLIN HFA) 90 mcg/act inhaler   Yes No   Sig: Inhale 1-2 puffs   amLODIPine (NORVASC) 10 mg tablet   No No   Sig: Take 1 tablet (10 mg total) by mouth daily   montelukast (SINGULAIR) 10 mg tablet   Yes No   Sig: Take 10 mg by mouth daily at bedtime  Facility-Administered Medications: None       Past Medical History:   Diagnosis Date    Asthma     Hypertension        Past Surgical History:   Procedure Laterality Date    THUMB AMPUTATION Left 09/2015       History reviewed  No pertinent family history  I have reviewed and agree with the history as documented  E-Cigarette/Vaping     E-Cigarette/Vaping Substances     Social History     Tobacco Use    Smoking status: Never Smoker    Smokeless tobacco: Never Used   Substance Use Topics    Alcohol use: Yes     Comment: occasionally    Drug use: No       Review of Systems   Constitutional: Negative for fatigue and fever          Hot and cold sweats resolved  "foggy feeling" resolved   HENT: Negative for rhinorrhea and sore throat  Eyes: Negative for visual disturbance  Respiratory: Negative for cough and shortness of breath  Cardiovascular: Negative for chest pain  Gastrointestinal: Negative for abdominal pain, nausea and vomiting  Genitourinary: Negative for dysuria and frequency  Allergic/Immunologic: Negative for immunocompromised state  Neurological: Negative for dizziness, seizures, syncope, facial asymmetry, speech difficulty, weakness, light-headedness, numbness and headaches  Tremors resolved   Psychiatric/Behavioral: Negative for behavioral problems, confusion, decreased concentration, dysphoric mood, hallucinations, self-injury and suicidal ideas  The patient is nervous/anxious  All other systems reviewed and are negative  Physical Exam  Physical Exam  Vitals and nursing note reviewed  Constitutional:       General: He is not in acute distress  Appearance: Normal appearance  He is well-developed  He is not ill-appearing, toxic-appearing or diaphoretic  HENT:      Head: Normocephalic and atraumatic  Mouth/Throat:      Mouth: Mucous membranes are moist    Eyes:      Conjunctiva/sclera: Conjunctivae normal       Pupils: Pupils are equal, round, and reactive to light  Cardiovascular:      Rate and Rhythm: Normal rate and regular rhythm  Heart sounds: Normal heart sounds  Pulmonary:      Effort: Pulmonary effort is normal       Breath sounds: Normal breath sounds  Abdominal:      General: Bowel sounds are normal       Palpations: Abdomen is soft  Tenderness: There is no abdominal tenderness  Musculoskeletal:      Cervical back: Normal range of motion and neck supple  Skin:     General: Skin is warm and dry  Comments: +1cm raised, nontender lipoma over mid left back   Neurological:      General: No focal deficit present  Mental Status: He is alert and oriented to person, place, and time  Mental status is at baseline        GCS: GCS eye subscore is 4  GCS verbal subscore is 5  GCS motor subscore is 6  Motor: No tremor  Psychiatric:         Mood and Affect: Mood is anxious  Speech: Speech normal          Behavior: Behavior normal  Behavior is cooperative  Thought Content: Thought content normal  Thought content does not include homicidal or suicidal ideation  Vital Signs  ED Triage Vitals [08/28/22 1128]   Temperature Pulse Respirations Blood Pressure SpO2   98 6 °F (37 °C) 81 18 140/86 98 %      Temp Source Heart Rate Source Patient Position - Orthostatic VS BP Location FiO2 (%)   Oral -- Sitting Right arm --      Pain Score       No Pain           Vitals:    08/28/22 1128   BP: 140/86   Pulse: 81   Patient Position - Orthostatic VS: Sitting         Visual Acuity      ED Medications  Medications   LORazepam (ATIVAN) tablet 1 mg (1 mg Oral Given 8/28/22 1207)       Diagnostic Studies  Results Reviewed     Procedure Component Value Units Date/Time    HS Troponin 0hr (reflex protocol) [[id number]]  (Normal) Collected: 08/28/22 1221    Lab Status: Final result Specimen: Blood from Arm, Left Updated: 08/28/22 1307     hs TnI 0hr 4 ng/L     HS Troponin I 2hr [[id number]]     Lab Status: No result Specimen: Blood     TSH [[id number]]  (Normal) Collected: 08/28/22 1221    Lab Status: Final result Specimen: Blood from Arm, Left Updated: 08/28/22 1306     TSH 3RD GENERATON 1 245 uIU/mL     Narrative:      Patients undergoing fluorescein dye angiography may retain small amounts of fluorescein in the body for 48-72 hours post procedure  Samples containing fluorescein can produce falsely depressed TSH values  If the patient had this procedure,a specimen should be resubmitted post fluorescein clearance        Comprehensive metabolic panel [[id number]]  (Abnormal) Collected: 08/28/22 1221    Lab Status: Final result Specimen: Blood from Arm, Left Updated: 08/28/22 1249     Sodium 136 mmol/L      Potassium 3 7 mmol/L      Chloride 103 mmol/L      CO2 26 mmol/L      ANION GAP 7 mmol/L      BUN 18 mg/dL      Creatinine 1 14 mg/dL      Glucose 188 mg/dL      Calcium 9 6 mg/dL      AST 40 U/L      ALT 65 U/L      Alkaline Phosphatase 55 U/L      Total Protein 7 3 g/dL      Albumin 4 5 g/dL      Total Bilirubin 0 74 mg/dL      eGFR 77 ml/min/1 73sq m     Narrative:      [place] guidelines for Chronic Kidney Disease (CKD):     Stage 1 with normal or high GFR (GFR > 90 mL/min/1 73 square meters)    Stage 2 Mild CKD (GFR = 60-89 mL/min/1 73 square meters)    Stage 3A Moderate CKD (GFR = 45-59 mL/min/1 73 square meters)    Stage 3B Moderate CKD (GFR = 30-44 mL/min/1 73 square meters)    Stage 4 Severe CKD (GFR = 15-29 mL/min/1 73 square meters)    Stage 5 End Stage CKD (GFR <15 mL/min/1 73 square meters)  Note: GFR calculation is accurate only with a steady state creatinine    CBC and differential [[id number]]  (Abnormal) Collected: 08/28/22 1221    Lab Status: Final result Specimen: Blood from Arm, Left Updated: 08/28/22 1234     WBC 5 44 Thousand/uL      RBC 4 88 Million/uL      Hemoglobin 14 9 g/dL      Hematocrit 43 6 %      MCV 89 fL      MCH 30 5 pg      MCHC 34 2 g/dL      RDW 13 1 %      MPV 10 5 fL      Platelets 118 Thousands/uL      nRBC 0 /100 WBCs      Neutrophils Relative 77 %      Immat GRANS % 0 %      Lymphocytes Relative 14 %      Monocytes Relative 6 %      Eosinophils Relative 2 %      Basophils Relative 1 %      Neutrophils Absolute 4 21 Thousands/µL      Immature Grans Absolute 0 02 Thousand/uL      Lymphocytes Absolute 0 75 Thousands/µL      Monocytes Absolute 0 34 Thousand/µL      Eosinophils Absolute 0 08 Thousand/µL      Basophils Absolute 0 04 Thousands/µL                  No orders to display              Procedures  Procedures         ED Course  ED Course as of 08/28/22 1328   Sun Aug 28, 2022   1326 Reviewed ekg and labs with patient  Will discharge  Asked to return to ED if anything changes  Feeling much better compared to initially  Still very lightly shaking subjectively  1326 AST(!): 40  Had slight elevation in past  Not concerned at this time, but informed patient this  T waves some inverted, similar to old ekg  MDM    Disposition  Final diagnoses:   Anxiety     Time reflects when diagnosis was documented in both MDM as applicable and the Disposition within this note     Time User Action Codes Description Comment    8/28/2022  1:27 PM Lan Fung Add [F41 9] Anxiety       ED Disposition     ED Disposition   Discharge    Condition   Stable    Date/Time   Sun Aug 28, 2022  1:27 PM    Comment   Zuly Ortiz discharge to home/self care  Follow-up Information     Follow up With Specialties Details Why Contact Info Additional Information    Sandra Freeman MD Internal Medicine In 1 week  [address] [facility]  [address]  [address] [place] [address]  [facility] Emergency Department Emergency Medicine  If symptoms worsen [address] Emergency Department, [address], [place] [facility] [place], [address] [place] [address], [facility]          Patient's Medications   Discharge Prescriptions    No medications on file       No discharge procedures on file      PDMP Review     None          ED Provider  Electronically Signed by           Alonzo Del Angel PA-C  08/28/22 5646

## 2023-09-02 ENCOUNTER — HOSPITAL ENCOUNTER (EMERGENCY)
Facility: HOSPITAL | Age: 48
Discharge: HOME | End: 2023-09-02
Attending: EMERGENCY MEDICINE
Payer: COMMERCIAL

## 2023-09-02 VITALS
BODY MASS INDEX: 34.98 KG/M2 | HEART RATE: 104 BPM | DIASTOLIC BLOOD PRESSURE: 93 MMHG | WEIGHT: 257.94 LBS | OXYGEN SATURATION: 96 % | TEMPERATURE: 98.1 F | SYSTOLIC BLOOD PRESSURE: 139 MMHG | RESPIRATION RATE: 20 BRPM

## 2023-09-02 DIAGNOSIS — T63.441A BEE STING: Primary | ICD-10-CM

## 2023-09-02 LAB
ALBUMIN SERPL BCP-MCNC: 4.6 G/DL (ref 3.5–5)
ALP SERPL-CCNC: 55 U/L (ref 34–104)
ALT SERPL W P-5'-P-CCNC: 71 U/L (ref 7–52)
ANION GAP SERPL CALCULATED.3IONS-SCNC: 10 MMOL/L
AST SERPL W P-5'-P-CCNC: 30 U/L (ref 13–39)
ATRIAL RATE: 103 BPM
BASOPHILS # BLD AUTO: 0.01 THOUSANDS/ÂΜL (ref 0–0.1)
BASOPHILS NFR BLD AUTO: 0 % (ref 0–1)
BILIRUB SERPL-MCNC: 0.72 MG/DL (ref 0.2–1)
BUN SERPL-MCNC: 15 MG/DL (ref 5–25)
CALCIUM SERPL-MCNC: 9.3 MG/DL (ref 8.4–10.2)
CHLORIDE SERPL-SCNC: 105 MMOL/L (ref 96–108)
CO2 SERPL-SCNC: 26 MMOL/L (ref 21–32)
CREAT SERPL-MCNC: 1.14 MG/DL (ref 0.6–1.3)
EOSINOPHIL # BLD AUTO: 0.03 THOUSAND/ÂΜL (ref 0–0.61)
EOSINOPHIL NFR BLD AUTO: 0 % (ref 0–6)
ERYTHROCYTE [DISTWIDTH] IN BLOOD BY AUTOMATED COUNT: 13.1 % (ref 11.6–15.1)
GFR SERPL CREATININE-BSD FRML MDRD: 76 ML/MIN/1.73SQ M
GLUCOSE SERPL-MCNC: 96 MG/DL (ref 65–140)
HCT VFR BLD AUTO: 48.4 % (ref 36.5–49.3)
HGB BLD-MCNC: 16.3 G/DL (ref 12–17)
IMM GRANULOCYTES # BLD AUTO: 0.05 THOUSAND/UL (ref 0–0.2)
IMM GRANULOCYTES NFR BLD AUTO: 1 % (ref 0–2)
LYMPHOCYTES # BLD AUTO: 1.28 THOUSANDS/ÂΜL (ref 0.6–4.47)
LYMPHOCYTES NFR BLD AUTO: 14 % (ref 14–44)
MCH RBC QN AUTO: 30.8 PG (ref 26.8–34.3)
MCHC RBC AUTO-ENTMCNC: 33.7 G/DL (ref 31.4–37.4)
MCV RBC AUTO: 91 FL (ref 82–98)
MONOCYTES # BLD AUTO: 0.52 THOUSAND/ÂΜL (ref 0.17–1.22)
MONOCYTES NFR BLD AUTO: 6 % (ref 4–12)
NEUTROPHILS # BLD AUTO: 7.29 THOUSANDS/ÂΜL (ref 1.85–7.62)
NEUTS SEG NFR BLD AUTO: 79 % (ref 43–75)
NRBC BLD AUTO-RTO: 0 /100 WBCS
P AXIS: 30 DEGREES
PLATELET # BLD AUTO: 172 THOUSANDS/UL (ref 149–390)
PMV BLD AUTO: 10.4 FL (ref 8.9–12.7)
POTASSIUM SERPL-SCNC: 3.4 MMOL/L (ref 3.5–5.3)
PR INTERVAL: 192 MS
PROT SERPL-MCNC: 7.4 G/DL (ref 6.4–8.4)
QRS AXIS: 7 DEGREES
QRSD INTERVAL: 84 MS
QT INTERVAL: 358 MS
QTC INTERVAL: 468 MS
RBC # BLD AUTO: 5.3 MILLION/UL (ref 3.88–5.62)
SODIUM SERPL-SCNC: 141 MMOL/L (ref 135–147)
T WAVE AXIS: 28 DEGREES
VENTRICULAR RATE: 103 BPM
WBC # BLD AUTO: 9.18 THOUSAND/UL (ref 4.31–10.16)

## 2023-09-02 PROCEDURE — 80053 COMPREHEN METABOLIC PANEL: CPT | Performed by: EMERGENCY MEDICINE

## 2023-09-02 PROCEDURE — 99283 EMERGENCY DEPT VISIT LOW MDM: CPT

## 2023-09-02 PROCEDURE — 85025 COMPLETE CBC W/AUTO DIFF WBC: CPT | Performed by: EMERGENCY MEDICINE

## 2023-09-02 PROCEDURE — 36415 COLL VENOUS BLD VENIPUNCTURE: CPT | Performed by: EMERGENCY MEDICINE

## 2023-09-02 PROCEDURE — 93005 ELECTROCARDIOGRAM TRACING: CPT

## 2023-09-02 PROCEDURE — 96365 THER/PROPH/DIAG IV INF INIT: CPT

## 2023-09-02 PROCEDURE — 99284 EMERGENCY DEPT VISIT MOD MDM: CPT | Performed by: EMERGENCY MEDICINE

## 2023-09-02 RX ORDER — METOPROLOL SUCCINATE 50 MG/1
50 TABLET, EXTENDED RELEASE ORAL DAILY
COMMUNITY

## 2023-09-02 RX ADMIN — SODIUM CHLORIDE, SODIUM LACTATE, POTASSIUM CHLORIDE, AND CALCIUM CHLORIDE 1000 ML: .6; .31; .03; .02 INJECTION, SOLUTION INTRAVENOUS at 19:29

## 2023-09-03 NOTE — ED PROVIDER NOTES
History  Chief Complaint   Patient presents with   • Bee Sting     Patient arrived via EMS for eval of bee sting that happened around 1800 on rt ankle. Pt denies any complications, states he was doing yard work when it happened. Patients wife stated patient was pale and diaphoretic and had a syncopal episode that lasted about 2 mins. GCS 15 on arrival, pink, warm/dry. 81-year-old male with no previous medical history presents for evaluation of lightheadedness after being stung by a bee. Patient states he was stung and then developed lightheadedness. Denies any chest pain or shortness of breath. No back pain. This happened about an hour ago. Denies any chest pain or shortness of breath. No nausea or vomiting. No back pain. No palpitations. History provided by:  Patient      Prior to Admission Medications   Prescriptions Last Dose Informant Patient Reported? Taking? albuterol (VENTOLIN HFA) 90 mcg/act inhaler   Yes Yes   Sig: Inhale 1-2 puffs   amLODIPine (NORVASC) 10 mg tablet   No Yes   Sig: Take 1 tablet (10 mg total) by mouth daily   metoprolol succinate (TOPROL-XL) 50 mg 24 hr tablet  Self Yes Yes   Sig: Take 50 mg by mouth daily   montelukast (SINGULAIR) 10 mg tablet Not Taking  Yes No   Sig: Take 10 mg by mouth daily at bedtime. Patient not taking: Reported on 9/2/2023      Facility-Administered Medications: None       Past Medical History:   Diagnosis Date   • Asthma    • Hypertension        Past Surgical History:   Procedure Laterality Date   • THUMB AMPUTATION Left 09/2015       History reviewed. No pertinent family history. I have reviewed and agree with the history as documented. E-Cigarette/Vaping     E-Cigarette/Vaping Substances     Social History     Tobacco Use   • Smoking status: Never   • Smokeless tobacco: Never   Substance Use Topics   • Alcohol use: Yes     Comment: occasionally   • Drug use: No       Review of Systems   Constitutional: Negative for chills and fever. HENT: Negative for ear pain and sore throat. Eyes: Negative for pain and visual disturbance. Respiratory: Negative for cough and shortness of breath. Cardiovascular: Negative for chest pain and palpitations. Gastrointestinal: Negative for abdominal pain and vomiting. Endocrine: Negative for cold intolerance. Genitourinary: Negative for dysuria and hematuria. Musculoskeletal: Negative for arthralgias and back pain. Skin: Negative for color change and rash. Allergic/Immunologic: Negative for environmental allergies and food allergies. Neurological: Negative for dizziness, seizures, syncope and facial asymmetry. Hematological: Negative for adenopathy. Psychiatric/Behavioral: Negative for agitation and behavioral problems. All other systems reviewed and are negative. Physical Exam  Physical Exam  Vitals and nursing note reviewed. Constitutional:       General: He is not in acute distress. Appearance: He is well-developed. HENT:      Head: Normocephalic and atraumatic. Nose: Nose normal. No congestion or rhinorrhea. Mouth/Throat:      Mouth: Mucous membranes are moist.      Pharynx: No oropharyngeal exudate or posterior oropharyngeal erythema. Eyes:      Extraocular Movements: Extraocular movements intact. Conjunctiva/sclera: Conjunctivae normal.      Pupils: Pupils are equal, round, and reactive to light. Neck:      Vascular: No carotid bruit. Cardiovascular:      Rate and Rhythm: Normal rate and regular rhythm. Heart sounds: No murmur heard. Pulmonary:      Effort: Pulmonary effort is normal. No respiratory distress. Breath sounds: Normal breath sounds. Abdominal:      Palpations: Abdomen is soft. Tenderness: There is no abdominal tenderness. Musculoskeletal:         General: No swelling, tenderness or deformity. Cervical back: Normal range of motion and neck supple. No rigidity or tenderness.    Lymphadenopathy:      Cervical: No cervical adenopathy. Skin:     General: Skin is warm and dry. Capillary Refill: Capillary refill takes less than 2 seconds. Coloration: Skin is not jaundiced or pale. Findings: No bruising or erythema. Neurological:      General: No focal deficit present. Mental Status: He is alert.    Psychiatric:         Mood and Affect: Mood normal.         Vital Signs  ED Triage Vitals [09/02/23 1906]   Temperature Pulse Respirations Blood Pressure SpO2   98.1 °F (36.7 °C) 104 20 139/93 96 %      Temp Source Heart Rate Source Patient Position - Orthostatic VS BP Location FiO2 (%)   Oral Monitor Sitting Right arm --      Pain Score       2           Vitals:    09/02/23 1906   BP: 139/93   Pulse: 104   Patient Position - Orthostatic VS: Sitting         Visual Acuity      ED Medications  Medications   lactated ringers bolus 1,000 mL (1,000 mL Intravenous New Bag 9/2/23 1929)       Diagnostic Studies  Results Reviewed     Procedure Component Value Units Date/Time    Comprehensive metabolic panel [[id number]]  (Abnormal) Collected: 09/02/23 1928    Lab Status: Final result Specimen: Blood from Arm, Left Updated: 09/02/23 2009     Sodium 141 mmol/L      Potassium 3.4 mmol/L      Chloride 105 mmol/L      CO2 26 mmol/L      ANION GAP 10 mmol/L      BUN 15 mg/dL      Creatinine 1.14 mg/dL      Glucose 96 mg/dL      Calcium 9.3 mg/dL      AST 30 U/L      ALT 71 U/L      Alkaline Phosphatase 55 U/L      Total Protein 7.4 g/dL      Albumin 4.6 g/dL      Total Bilirubin 0.72 mg/dL      eGFR 76 ml/min/1.73sq m     Narrative:      [place] guidelines for Chronic Kidney Disease (CKD):   •  Stage 1 with normal or high GFR (GFR > 90 mL/min/1.73 square meters)  •  Stage 2 Mild CKD (GFR = 60-89 mL/min/1.73 square meters)  •  Stage 3A Moderate CKD (GFR = 45-59 mL/min/1.73 square meters)  •  Stage 3B Moderate CKD (GFR = 30-44 mL/min/1.73 square meters)  •  Stage 4 Severe CKD (GFR = 15-29 mL/min/1.73 square meters)  •  Stage 5 End Stage CKD (GFR <15 mL/min/1.73 square meters)  Note: GFR calculation is accurate only with a steady state creatinine    CBC and differential [[id number]]  (Abnormal) Collected: 09/02/23 1928    Lab Status: Final result Specimen: Blood from Arm, Left Updated: 09/02/23 1935     WBC 9.18 Thousand/uL      RBC 5.30 Million/uL      Hemoglobin 16.3 g/dL      Hematocrit 48.4 %      MCV 91 fL      MCH 30.8 pg      MCHC 33.7 g/dL      RDW 13.1 %      MPV 10.4 fL      Platelets 883 Thousands/uL      nRBC 0 /100 WBCs      Neutrophils Relative 79 %      Immat GRANS % 1 %      Lymphocytes Relative 14 %      Monocytes Relative 6 %      Eosinophils Relative 0 %      Basophils Relative 0 %      Neutrophils Absolute 7.29 Thousands/µL      Immature Grans Absolute 0.05 Thousand/uL      Lymphocytes Absolute 1.28 Thousands/µL      Monocytes Absolute 0.52 Thousand/µL      Eosinophils Absolute 0.03 Thousand/µL      Basophils Absolute 0.01 Thousands/µL                  No orders to display              Procedures  Procedures         ED Course                                             Medical Decision Making  53-year-old male presents after feeling lightheaded after being stung by a bee. EKG normal sinus rhythm, no ST elevations or depression, no T wave inversions. Patient denies any symptoms at this time. No chest pain or shortness of breath. No back pain. He was observed on the monitor with no events. He is labs are reassuring. He was given IV fluids. He feels comfortable go home. Patient discharged in stable condition. Amount and/or Complexity of Data Reviewed  External Data Reviewed: labs. Labs: ordered.           Disposition  Final diagnoses:   Bee sting     Time reflects when diagnosis was documented in both MDM as applicable and the Disposition within this note     Time User Action Codes Description Comment    9/2/2023  8:10 PM Miriam Cisse Add [R47.862L] Bee sting       ED Disposition ED Disposition   Discharge    Condition   Stable    Date/Time   Sat Sep 2, 2023  8:10 PM    Comment   Jorge Soham Ortiz discharge to home/self care. Follow-up Information     Follow up With Specialties Details Why Contact Info    Pieter Dooley MD Internal Medicine   [facility]  [address]  [address] [phone]            Patient's Medications   Discharge Prescriptions    No medications on file       No discharge procedures on file.     PDMP Review     None          ED Provider  Electronically Signed by           Perry Robbins DO  09/02/23 2023

## 2023-09-03 NOTE — ED NOTES
Provided hygiene supplies     Johnie Walton RN  09/02/23 2014 Dementia with behavioral disturbance, unspecified dementia type    Essential hypertension    Urticaria

## 2023-09-05 LAB
ATRIAL RATE: 103 BPM
P AXIS: 30 DEGREES
PR INTERVAL: 192 MS
QRS AXIS: 7 DEGREES
QRSD INTERVAL: 84 MS
QT INTERVAL: 358 MS
QTC INTERVAL: 468 MS
T WAVE AXIS: 28 DEGREES
VENTRICULAR RATE: 103 BPM

## 2023-09-05 PROCEDURE — 93010 ELECTROCARDIOGRAM REPORT: CPT | Performed by: INTERNAL MEDICINE

## 2023-10-27 ENCOUNTER — HOSPITAL ENCOUNTER (OUTPATIENT)
Dept: PULMONOLOGY | Facility: HOSPITAL | Age: 48
Discharge: HOME | End: 2023-10-27
Payer: COMMERCIAL

## 2023-10-27 ENCOUNTER — HOSPITAL ENCOUNTER (OUTPATIENT)
Dept: RADIOLOGY | Facility: HOSPITAL | Age: 48
Discharge: HOME | End: 2023-10-27
Payer: COMMERCIAL

## 2023-10-27 DIAGNOSIS — J45.20 MILD INTERMITTENT INTRINSIC ASTHMA WITHOUT STATUS ASTHMATICUS WITHOUT COMPLICATION: ICD-10-CM

## 2023-10-27 DIAGNOSIS — J45.20 MILD INTERMITTENT ASTHMA, UNCOMPLICATED: ICD-10-CM

## 2023-10-27 PROCEDURE — 94729 DIFFUSING CAPACITY: CPT | Performed by: INTERNAL MEDICINE

## 2023-10-27 PROCEDURE — 94760 N-INVAS EAR/PLS OXIMETRY 1: CPT

## 2023-10-27 PROCEDURE — 94729 DIFFUSING CAPACITY: CPT

## 2023-10-27 PROCEDURE — 94060 EVALUATION OF WHEEZING: CPT

## 2023-10-27 PROCEDURE — 94726 PLETHYSMOGRAPHY LUNG VOLUMES: CPT

## 2023-10-27 PROCEDURE — 71046 X-RAY EXAM CHEST 2 VIEWS: CPT

## 2023-10-27 PROCEDURE — 94060 EVALUATION OF WHEEZING: CPT | Performed by: INTERNAL MEDICINE

## 2023-10-27 PROCEDURE — 94726 PLETHYSMOGRAPHY LUNG VOLUMES: CPT | Performed by: INTERNAL MEDICINE

## 2023-10-27 RX ORDER — ALBUTEROL SULFATE 2.5 MG/3ML
2.5 SOLUTION RESPIRATORY (INHALATION) ONCE
Status: COMPLETED | OUTPATIENT
Start: 2023-10-27 | End: 2023-10-27

## 2023-10-27 RX ADMIN — ALBUTEROL SULFATE 2.5 MG: 2.5 SOLUTION RESPIRATORY (INHALATION) at 07:26

## 2023-11-23 ENCOUNTER — HOSPITAL ENCOUNTER (EMERGENCY)
Facility: HOSPITAL | Age: 48
Discharge: HOME | End: 2023-11-23
Attending: EMERGENCY MEDICINE
Payer: COMMERCIAL

## 2023-11-23 VITALS
OXYGEN SATURATION: 96 % | DIASTOLIC BLOOD PRESSURE: 104 MMHG | SYSTOLIC BLOOD PRESSURE: 171 MMHG | TEMPERATURE: 97.5 F | RESPIRATION RATE: 18 BRPM | HEART RATE: 112 BPM

## 2023-11-23 DIAGNOSIS — K08.89 PAIN, DENTAL: Primary | ICD-10-CM

## 2023-11-23 PROCEDURE — 99284 EMERGENCY DEPT VISIT MOD MDM: CPT | Performed by: EMERGENCY MEDICINE

## 2023-11-23 PROCEDURE — 96372 THER/PROPH/DIAG INJ SC/IM: CPT

## 2023-11-23 PROCEDURE — 99282 EMERGENCY DEPT VISIT SF MDM: CPT

## 2023-11-23 RX ORDER — OXYCODONE HYDROCHLORIDE 5 MG/1
5 TABLET ORAL ONCE
Status: COMPLETED | OUTPATIENT
Start: 2023-11-23 | End: 2023-11-23

## 2023-11-23 RX ORDER — IBUPROFEN 600 MG/1
600 TABLET ORAL EVERY 6 HOURS PRN
Qty: 20 TABLET | Refills: 0 | Status: SHIPPED | OUTPATIENT
Start: 2023-11-23 | End: 2023-11-28

## 2023-11-23 RX ORDER — OXYCODONE HYDROCHLORIDE 5 MG/1
5 TABLET ORAL EVERY 6 HOURS PRN
Qty: 4 TABLET | Refills: 0 | Status: SHIPPED | OUTPATIENT
Start: 2023-11-23 | End: 2023-11-24

## 2023-11-23 RX ORDER — KETOROLAC TROMETHAMINE 30 MG/ML
15 INJECTION, SOLUTION INTRAMUSCULAR; INTRAVENOUS ONCE
Status: COMPLETED | OUTPATIENT
Start: 2023-11-23 | End: 2023-11-23

## 2023-11-23 RX ADMIN — OXYCODONE HYDROCHLORIDE 5 MG: 5 TABLET ORAL at 23:42

## 2023-11-23 RX ADMIN — KETOROLAC TROMETHAMINE 15 MG: 30 INJECTION, SOLUTION INTRAMUSCULAR; INTRAVENOUS at 23:42

## 2023-11-24 NOTE — ED NOTES
Pt with hx of hypertension, reports he has not taken his PM blood pressure meds tonight.       Roney Forman RN  11/23/23 3375

## 2023-11-26 NOTE — ED PROVIDER NOTES
History  Chief Complaint   Patient presents with    Dental Pain     Bottom right dental pain. Pt reports his tooth broke earlier today. Dental Pain  Associated symptoms: no fever and no headaches      57year-old presenting with dental pain, right lower side, broke tooth earlier today. Complaining of pain. took Excedrin earlier. No fevers. No chills. No drainage. No trouble swallowing. Has appointment with dentist on Wednesday. Prior to Admission Medications   Prescriptions Last Dose Informant Patient Reported? Taking? albuterol (VENTOLIN HFA) 90 mcg/act inhaler   Yes No   Sig: Inhale 1-2 puffs   amLODIPine (NORVASC) 10 mg tablet   No No   Sig: Take 1 tablet (10 mg total) by mouth daily   metoprolol succinate (TOPROL-XL) 50 mg 24 hr tablet  Self Yes No   Sig: Take 50 mg by mouth daily   montelukast (SINGULAIR) 10 mg tablet   Yes No   Sig: Take 10 mg by mouth daily at bedtime. Patient not taking: Reported on 9/2/2023      Facility-Administered Medications: None       Past Medical History:   Diagnosis Date    Asthma     Hypertension        Past Surgical History:   Procedure Laterality Date    THUMB AMPUTATION Left 09/2015       History reviewed. No pertinent family history. I have reviewed and agree with the history as documented. E-Cigarette/Vaping     E-Cigarette/Vaping Substances     Social History     Tobacco Use    Smoking status: Never    Smokeless tobacco: Never   Substance Use Topics    Alcohol use: Yes     Comment: occasionally    Drug use: No       Review of Systems   Constitutional:  Negative for chills, diaphoresis and fever. HENT:  Positive for dental problem. Respiratory:  Negative for cough, shortness of breath, wheezing and stridor. Cardiovascular:  Negative for chest pain, palpitations and leg swelling. Gastrointestinal:  Negative for abdominal pain, blood in stool, diarrhea, nausea and vomiting. Genitourinary:  Negative for dysuria, frequency and urgency. Musculoskeletal:  Negative for neck stiffness. Skin:  Negative for pallor and rash. Neurological:  Negative for dizziness, syncope, weakness, light-headedness and headaches. All other systems reviewed and are negative. Physical Exam  Physical Exam  Vitals reviewed. Constitutional:       Appearance: Normal appearance. He is well-developed. HENT:      Head: Normocephalic and atraumatic. Mouth/Throat:      Comments: Broken tooth right bottom, no sign of inflammation, no sign of infection, no swelling, floor of mouth symmetrical normal, no trismus, tolerating secretions  Eyes:      Extraocular Movements: Extraocular movements intact. Pupils: Pupils are equal, round, and reactive to light. Cardiovascular:      Rate and Rhythm: Normal rate and regular rhythm. Pulmonary:      Effort: Pulmonary effort is normal. No respiratory distress. Musculoskeletal:         General: No swelling or tenderness. Normal range of motion. Cervical back: Neck supple. Skin:     General: Skin is warm and dry. Capillary Refill: Capillary refill takes less than 2 seconds. Neurological:      General: No focal deficit present. Mental Status: He is alert and oriented to person, place, and time.          Vital Signs  ED Triage Vitals   Temperature Pulse Respirations Blood Pressure SpO2   11/23/23 2302 11/23/23 2302 11/23/23 2302 11/23/23 2302 11/23/23 2302   97.5 °F (36.4 °C) (!) 112 18 (!) 171/104 96 %      Temp Source Heart Rate Source Patient Position - Orthostatic VS BP Location FiO2 (%)   11/23/23 2302 11/23/23 2302 -- 11/23/23 2302 --   Oral Monitor  Right arm       Pain Score       11/23/23 2301       8           Vitals:    11/23/23 2302   BP: (!) 171/104   Pulse: (!) 112         Visual Acuity      ED Medications  Medications   ketorolac (TORADOL) injection 15 mg (15 mg Intramuscular Given 11/23/23 2342)   oxyCODONE (ROXICODONE) IR tablet 5 mg (5 mg Oral Given 11/23/23 2342)       Diagnostic Studies  Results Reviewed       None                   No orders to display              Procedures  Procedures         ED Course                                             Medical Decision Making  62-year-old with broken tooth, no sign of infection, pain control, outpatient follow-up    Risk  Prescription drug management. Disposition  Final diagnoses:   Pain, dental     Time reflects when diagnosis was documented in both MDM as applicable and the Disposition within this note       Time User Action Codes Description Comment    11/23/2023 11:35 PM Taj Em Add [K08.89] Pain, dental           ED Disposition       ED Disposition   Discharge    Condition   Stable    Date/Time   Thu Nov 23, 2023 11:35 PM    Comment   Sheba Ortiz discharge to home/self care.                    Follow-up Information       Follow up With Specialties Details Why Contact Info Additional Information    [address] [facility] Emergency Medicine  As needed, If symptoms worsen [address] [facility], [address]  Schedule an appointment as soon as possible for a visit   [facility] [id number]  [address] [place]   [phone]             Discharge Medication List as of 11/23/2023 11:37 PM        START taking these medications    Details   ibuprofen (MOTRIN) 600 mg tablet Take 1 tablet (600 mg total) by mouth every 6 (six) hours as needed for mild pain for up to 5 days, Starting Thu 11/23/2023, Until Tue 11/28/2023 at 2359, Normal      oxyCODONE (Roxicodone) 5 immediate release tablet Take 1 tablet (5 mg total) by mouth every 6 (six) hours as needed for moderate pain for up to 1 day Max Daily Amount: 20 mg, Starting Thu 11/23/2023, Until Fri 11/24/2023 at 2359, Normal           CONTINUE these medications which have NOT CHANGED    Details   albuterol (VENTOLIN HFA) 90 mcg/act inhaler Inhale 1-2 puffs, Historical Med      amLODIPine (NORVASC) 10 mg tablet Take 1 tablet (10 mg total) by mouth daily, Starting Sun 3/21/2021, Normal      metoprolol succinate (TOPROL-XL) 50 mg 24 hr tablet Take 50 mg by mouth daily, Historical Med      montelukast (SINGULAIR) 10 mg tablet Take 10 mg by mouth daily at bedtime. , Until Discontinued, Historical Med                 PDMP Review       None            ED Provider  Electronically Signed by             Meagan Jaime MD  11/26/23 2976

## 2024-01-30 ENCOUNTER — APPOINTMENT (OUTPATIENT)
Dept: RADIOLOGY | Facility: CLINIC | Age: 49
End: 2024-01-30
Payer: COMMERCIAL

## 2024-01-30 ENCOUNTER — OFFICE VISIT (OUTPATIENT)
Dept: URGENT CARE | Facility: CLINIC | Age: 49
End: 2024-01-30
Payer: COMMERCIAL

## 2024-01-30 VITALS
SYSTOLIC BLOOD PRESSURE: 144 MMHG | HEART RATE: 97 BPM | RESPIRATION RATE: 18 BRPM | TEMPERATURE: 98.1 F | DIASTOLIC BLOOD PRESSURE: 96 MMHG | OXYGEN SATURATION: 97 %

## 2024-01-30 DIAGNOSIS — S99.912A INJURY OF LEFT ANKLE, INITIAL ENCOUNTER: Primary | ICD-10-CM

## 2024-01-30 DIAGNOSIS — S99.912A INJURY OF LEFT ANKLE, INITIAL ENCOUNTER: ICD-10-CM

## 2024-01-30 PROCEDURE — 73610 X-RAY EXAM OF ANKLE: CPT

## 2024-01-30 PROCEDURE — 99213 OFFICE O/P EST LOW 20 MIN: CPT | Performed by: NURSE PRACTITIONER

## 2024-01-30 NOTE — PROGRESS NOTES
[facility]        NAME: Ilya Ortiz is a 48 y.o. male  : 1975    MRN: [id number]  DATE: 2024  TIME: 11:40 AM    Assessment and Plan   Injury of left ankle, initial encounter [S99.912A]  1. Injury of left ankle, initial encounter  XR ankle 3+ vw left            Patient Instructions     No fracture  Calcaneal spur  Follow up with PCP in 3-5 days.  Proceed to  ER if symptoms worsen.    Chief Complaint     Chief Complaint   Patient presents with    Ankle Injury     X 1 week patient reports he hit his left ankle on the tub, c/o left ankle pain that radiate up towards          History of Present Illness       HPI  Reports 1.5 weeks ago he slid in the bathroom and hit the front of the L ankle. Has been in pain since; not getting better. No radiation of the pain. Better at rest. Worse with weight bearing.     Review of Systems   Review of Systems   Musculoskeletal:  Positive for gait problem (bc of L ankle injury).   Skin:  Negative for color change and wound.   Neurological:  Negative for numbness.         Current Medications       Current Outpatient Medications:     albuterol (VENTOLIN HFA) 90 mcg/act inhaler, Inhale 1-2 puffs, Disp: , Rfl:     amLODIPine (NORVASC) 10 mg tablet, Take 1 tablet (10 mg total) by mouth daily, Disp: 30 tablet, Rfl: 0    metoprolol succinate (TOPROL-XL) 50 mg 24 hr tablet, Take 50 mg by mouth daily, Disp: , Rfl:     montelukast (SINGULAIR) 10 mg tablet, Take 10 mg by mouth daily at bedtime, Disp: , Rfl:     ibuprofen (MOTRIN) 600 mg tablet, Take 1 tablet (600 mg total) by mouth every 6 (six) hours as needed for mild pain for up to 5 days, Disp: 20 tablet, Rfl: 0    Current Allergies     Allergies as of 2024 - Reviewed 2024   Allergen Reaction Noted    Pollen extract Other (See Comments) 10/30/2014            The following portions of the patient's history were reviewed and updated as appropriate: allergies, current medications, past family history,  past medical history, past social history, past surgical history and problem list.     Past Medical History:   Diagnosis Date    Asthma     Hypertension        Past Surgical History:   Procedure Laterality Date    THUMB AMPUTATION Left 09/2015       History reviewed. No pertinent family history.      Medications have been verified.        Objective   /96   Pulse 97   Temp 98.1 °F (36.7 °C)   Resp 18   SpO2 97%   No LMP for male patient.       Physical Exam     Physical Exam  Musculoskeletal:         General: Tenderness (with palpation of the anterior aspect of the ankle, and just above the ankle anteriorly) present. No swelling or deformity. Normal range of motion.   Skin:     Findings: No bruising or erythema.

## 2024-02-12 ENCOUNTER — OFFICE VISIT (OUTPATIENT)
Dept: OBGYN CLINIC | Facility: CLINIC | Age: 49
End: 2024-02-12
Payer: COMMERCIAL

## 2024-02-12 VITALS
HEIGHT: 72 IN | WEIGHT: 257 LBS | BODY MASS INDEX: 34.81 KG/M2 | DIASTOLIC BLOOD PRESSURE: 103 MMHG | SYSTOLIC BLOOD PRESSURE: 163 MMHG | HEART RATE: 121 BPM | RESPIRATION RATE: 17 BRPM

## 2024-02-12 DIAGNOSIS — M84.362A STRESS FRACTURE OF LEFT TIBIA, INITIAL ENCOUNTER: Primary | ICD-10-CM

## 2024-02-12 PROCEDURE — 99204 OFFICE O/P NEW MOD 45 MIN: CPT | Performed by: ORTHOPAEDIC SURGERY

## 2024-02-12 RX ORDER — OLMESARTAN MEDOXOMIL 40 MG/1
TABLET ORAL
COMMUNITY

## 2024-02-12 RX ORDER — OXYCODONE HYDROCHLORIDE 5 MG/1
TABLET ORAL
COMMUNITY
Start: 2023-11-24

## 2024-02-12 NOTE — PROGRESS NOTES
Assessment:  1. Stress fracture of left tibia, initial encounter          Patient Active Problem List   Diagnosis    Enterocolitis    Obstructive sleep apnea syndrome    Hypertensive urgency    Hypokalemia    Abnormal CT scan    Occipital headache           Plan      MRI because I have a high index of suspicion that there is a stress reaction of the distal third of the tibia and if we do not do that there is a potential that it can become a stress fracture which can lead to a true fracture.  The meantime I talked to him and counseled him on this and recommended that he use an assistive device to walk around like a cane or a crutch the cane or crutches should be in the opposite hand to take some pressure off this.  If his pain is getting worse he is to get off of the leg entirely.            Subjective:     Patient ID:    Chief Complaint:Ilya Ortiz 49 y.o. male      HPI    Patient comes in today with regards to left leg.  The patient reports that the pain is in the anterior lateral aspect of the left lower leg.  To the distal third of the tibia.  And has been going on for approximately 1 month.  Patient hit his leg off of the tub.  He thought it would get better but seems to have gotten worse.  The pain does go down the rest of the leg especially with ambulation into the ankle.  He does notice some swelling as well.  The pain is rated at2 at its best and7 at its worst.  The pain is described as constant and sometimes shooting which shoots up approximately 2 no further than half of the lower leg.  It is worsened with walking and standing, and is made better with sitting but takes a little while after sitting for to go away..  The patient has taken ice and ibuprofen for treatment helps but it swells back up.      The following portions of the patient's history were reviewed and updated as appropriate: allergies, current medications, past family history, past social history, past surgical history and problem  list.        Social History     Socioeconomic History    Marital status: /Civil Union     Spouse name: Not on file    Number of children: Not on file    Years of education: Not on file    Highest education level: Not on file   Occupational History    Not on file   Tobacco Use    Smoking status: Never    Smokeless tobacco: Never   Substance and Sexual Activity    Alcohol use: Yes     Comment: occasionally    Drug use: No    Sexual activity: Not on file   Other Topics Concern    Not on file   Social History Narrative    Not on file     Social Determinants of Health     Financial Resource Strain: Not on file   Food Insecurity: Not on file   Transportation Needs: Not on file   Physical Activity: Not on file   Stress: Not on file   Social Connections: Not on file   Intimate Partner Violence: Not on file   Housing Stability: Not on file     Past Medical History:   Diagnosis Date    Asthma     Hypertension      Past Surgical History:   Procedure Laterality Date    THUMB AMPUTATION Left 09/2015     Allergies   Allergen Reactions    Bee Venom Syncope    Pollen Extract Other (See Comments)     Current Outpatient Medications on File Prior to Visit   Medication Sig Dispense Refill    albuterol (VENTOLIN HFA) 90 mcg/act inhaler Inhale 1-2 puffs      amLODIPine (NORVASC) 10 mg tablet Take 1 tablet (10 mg total) by mouth daily 30 tablet 0    montelukast (SINGULAIR) 10 mg tablet Take 10 mg by mouth daily at bedtime      olmesartan (BENICAR) 40 mg tablet       oxyCODONE (ROXICODONE) 5 immediate release tablet TAKE 1 TABLET (5 MG TOTAL) BY MOUTH EVERY 6 (SIX) HOURS AS NEEDED FOR MODERATE PAIN FOR UP TO 1 DAY MAX DAILY AMOUNT  20 MG      ibuprofen (MOTRIN) 600 mg tablet Take 1 tablet (600 mg total) by mouth every 6 (six) hours as needed for mild pain for up to 5 days 20 tablet 0    metoprolol succinate (TOPROL-XL) 50 mg 24 hr tablet Take 50 mg by mouth daily       No current facility-administered medications on file prior to  "visit.              Objective:    Review of Systems   Constitutional: Negative.    HENT: Negative.     Eyes: Negative.    Respiratory: Negative.     Cardiovascular: Negative.    Gastrointestinal: Negative.  Negative for vomiting.   Genitourinary: Negative.    Musculoskeletal:         Please refer to HPI   Skin: Negative.    Neurological: Negative.    Hematological: Negative.    Psychiatric/Behavioral: Negative.     All other systems reviewed and are negative.      Ortho Exam    Physical Exam  Vitals and nursing note reviewed.   Constitutional:       Appearance: He is well-developed.   HENT:      Head: Normocephalic.   Eyes:      Pupils: Pupils are equal, round, and reactive to light.   Pulmonary:      Effort: Pulmonary effort is normal.   Abdominal:      General: There is no distension.   Musculoskeletal:      Cervical back: Neck supple.   Skin:     General: Skin is warm.   Neurological:      Mental Status: He is alert and oriented to person, place, and time.         Procedures  No Procedures performed today    X-rays show some spurring off the talus but otherwise unremarkable x-ray      Portions of the record may have been created with voice recognition software.  Occasional wrong word or \"sound a like\" substitutions may have occurred due to the inherent limitations of voice recognition software.  Read the chart carefully and recognize, using context, where substitutions have occurred.  "

## 2024-02-16 ENCOUNTER — TELEPHONE (OUTPATIENT)
Dept: OBGYN CLINIC | Facility: HOSPITAL | Age: 49
End: 2024-02-16

## 2024-02-16 ENCOUNTER — TELEPHONE (OUTPATIENT)
Dept: OBGYN CLINIC | Facility: CLINIC | Age: 49
End: 2024-02-16

## 2024-02-16 NOTE — TELEPHONE ENCOUNTER
Caller: Patient    Doctor: Hugo    Reason for call: Patient dx with fx left tibia.  MRI 2/21/24.  Patient is having increased pain when walking/standing at work.  Pain 8-9/10.  Please advise with recommendations.    Call back#: [phone]

## 2024-02-16 NOTE — TELEPHONE ENCOUNTER
Called and spoke with patient regarding persistent pain of left leg pain.  Patient states he has been bearing weight on the left leg secondary to his job as a .  Patient was instructed that he should not be bearing weight on his left leg secondary to suspected stress fracture.  Patient will be getting MRI to rule out stress fracture and follow-up with us pending results to discuss further management.  Patient should remain nonweightbearing on the left lower extremity until results of MRI and follow-up with our office.  Work note provided today stating patient should remain  nonweightbearing and may perform light duty.  Patient expressed understanding of weightbearing status.  Patient agreed.     Lori Hurtado PAC

## 2024-02-21 ENCOUNTER — HOSPITAL ENCOUNTER (OUTPATIENT)
Dept: MRI IMAGING | Facility: HOSPITAL | Age: 49
Discharge: HOME | End: 2024-02-21
Attending: ORTHOPAEDIC SURGERY
Payer: COMMERCIAL

## 2024-02-21 ENCOUNTER — HOSPITAL ENCOUNTER (OUTPATIENT)
Dept: VASCULAR ULTRASOUND | Facility: HOSPITAL | Age: 49
Discharge: HOME | End: 2024-02-21
Payer: COMMERCIAL

## 2024-02-21 DIAGNOSIS — M84.362A STRESS FRACTURE OF LEFT TIBIA, INITIAL ENCOUNTER: ICD-10-CM

## 2024-02-21 DIAGNOSIS — M79.605 LEFT LEG PAIN: ICD-10-CM

## 2024-02-21 DIAGNOSIS — R22.42 MASS OF LOWER LEG, LEFT: ICD-10-CM

## 2024-02-21 PROCEDURE — 93971 EXTREMITY STUDY: CPT | Performed by: SURGERY

## 2024-02-21 PROCEDURE — G1004 CDSM NDSC: HCPCS

## 2024-02-21 PROCEDURE — 73718 MRI LOWER EXTREMITY W/O DYE: CPT

## 2024-02-21 PROCEDURE — 93971 EXTREMITY STUDY: CPT

## 2024-02-23 ENCOUNTER — TELEPHONE (OUTPATIENT)
Dept: OBGYN CLINIC | Facility: CLINIC | Age: 49
End: 2024-02-23

## 2024-02-23 NOTE — TELEPHONE ENCOUNTER
Lvm for patient to stop weightbearing due per Dr. Arias. He has a stress fracture. Patient is coming in 2/26/24 at 11:30.

## 2024-02-26 ENCOUNTER — OFFICE VISIT (OUTPATIENT)
Dept: OBGYN CLINIC | Facility: CLINIC | Age: 49
End: 2024-02-26
Payer: COMMERCIAL

## 2024-02-26 VITALS
BODY MASS INDEX: 34.46 KG/M2 | HEART RATE: 121 BPM | WEIGHT: 260 LBS | SYSTOLIC BLOOD PRESSURE: 165 MMHG | HEIGHT: 73 IN | DIASTOLIC BLOOD PRESSURE: 99 MMHG

## 2024-02-26 DIAGNOSIS — M84.362G STRESS FRACTURE OF LEFT TIBIA WITH DELAYED HEALING, SUBSEQUENT ENCOUNTER: Primary | ICD-10-CM

## 2024-02-26 PROCEDURE — 99213 OFFICE O/P EST LOW 20 MIN: CPT | Performed by: ORTHOPAEDIC SURGERY

## 2024-02-26 NOTE — LETTER
February 26, 2024     Patient: Ilya Ortiz  YOB: 1975  Date of Visit: 2/26/2024      To Whom it May Concern:    Ilya Ortiz is under my professional care. Ilya was seen in my office on 2/26/2024. Ilya may return to work on 2/26/2024 but no weightbearing on the left lower extremity must use crutches for any weightbearing activities.  We will reevaluate him in 4 weeks to assess progress and possibility increased activity .    If you have any questions or concerns, please don't hesitate to call.         Sincerely,          Osiel Arias DO        CC: No Recipients

## 2024-02-26 NOTE — PROGRESS NOTES
Assessment:  1. Stress fracture of left tibia with delayed healing, subsequent encounter          Patient Active Problem List   Diagnosis    Enterocolitis    Obstructive sleep apnea syndrome    Hypertensive urgency    Hypokalemia    Abnormal CT scan    Occipital headache    Stress fracture of left tibia           Plan      He is going to remain off this leg no weightbearing.  He will use the crutches.  He will come back to see us in about 4 weeks or more.  Will repeat the x-rays and retest him see if he is still painful over the tibia.  We may start weightbearing if he is not as painful partial weightbearing and progress as needed.  If this is not getting better and it has been 90 days since the initial visit where we suspected stress fracture we would order a bone stimulator.            Subjective:     Patient ID:    Chief Complaint:Ilya Ortiz 49 y.o. male      HPI    Patient comes in today with regards to his left lower leg.  We had a suspicion that he had a stress reaction to his left lower leg we sent him for an MRI he comes back today to review the MRI.  He is wearing his steel insert which is awesome.  He reports his pain is doing better but still has pain with weightbearing.      The following portions of the patient's history were reviewed and updated as appropriate: allergies, current medications, past family history, past social history, past surgical history and problem list.    All organ systems normal    Social History     Socioeconomic History    Marital status: /Civil Union     Spouse name: Not on file    Number of children: Not on file    Years of education: Not on file    Highest education level: Not on file   Occupational History    Not on file   Tobacco Use    Smoking status: Never    Smokeless tobacco: Never   Substance and Sexual Activity    Alcohol use: Yes     Comment: occasionally    Drug use: No    Sexual activity: Not on file   Other Topics Concern    Not on file   Social History  Narrative    Not on file     Social Determinants of Health     Financial Resource Strain: Not on file   Food Insecurity: Not on file   Transportation Needs: Not on file   Physical Activity: Not on file   Stress: Not on file   Social Connections: Not on file   Intimate Partner Violence: Not on file   Housing Stability: Not on file     Past Medical History:   Diagnosis Date    Asthma     Hypertension      Past Surgical History:   Procedure Laterality Date    THUMB AMPUTATION Left 09/2015     Allergies   Allergen Reactions    Bee Venom Syncope    Pollen Extract Other (See Comments)     Current Outpatient Medications on File Prior to Visit   Medication Sig Dispense Refill    albuterol (VENTOLIN HFA) 90 mcg/act inhaler Inhale 1-2 puffs      amLODIPine (NORVASC) 10 mg tablet Take 1 tablet (10 mg total) by mouth daily 30 tablet 0    ibuprofen (MOTRIN) 600 mg tablet Take 1 tablet (600 mg total) by mouth every 6 (six) hours as needed for mild pain for up to 5 days 20 tablet 0    olmesartan (BENICAR) 40 mg tablet       metoprolol succinate (TOPROL-XL) 50 mg 24 hr tablet Take 50 mg by mouth daily      [DISCONTINUED] montelukast (SINGULAIR) 10 mg tablet Take 10 mg by mouth daily at bedtime      [DISCONTINUED] oxyCODONE (ROXICODONE) 5 immediate release tablet TAKE 1 TABLET (5 MG TOTAL) BY MOUTH EVERY 6 (SIX) HOURS AS NEEDED FOR MODERATE PAIN FOR UP TO 1 DAY MAX DAILY AMOUNT  20 MG       No current facility-administered medications on file prior to visit.              Objective:        Ortho Exam  No effusion no ecchymosis he is using his brace the exam is unchanged since its only been roughly a couple days to 2 weeks since we last saw him.      I have personally reviewed pertinent films in PACS and my interpretation is MRI shows extensive soft tissue edema as well as bony edema involving the lower half of the tibia.  This is consistent with stress reaction stress fracture of the tibia.. and      Portions of the record may have  "been created with voice recognition software.  Occasional wrong word or \"sound a like\" substitutions may have occurred due to the inherent limitations of voice recognition software.  Read the chart carefully and recognize, using context, where substitutions have occurred.  "

## 2024-03-25 ENCOUNTER — APPOINTMENT (OUTPATIENT)
Dept: RADIOLOGY | Facility: AMBULARY SURGERY CENTER | Age: 49
End: 2024-03-25
Attending: ORTHOPAEDIC SURGERY
Payer: COMMERCIAL

## 2024-03-25 ENCOUNTER — OFFICE VISIT (OUTPATIENT)
Dept: OBGYN CLINIC | Facility: CLINIC | Age: 49
End: 2024-03-25
Payer: COMMERCIAL

## 2024-03-25 DIAGNOSIS — M84.362G STRESS FRACTURE OF LEFT TIBIA WITH DELAYED HEALING, SUBSEQUENT ENCOUNTER: Primary | ICD-10-CM

## 2024-03-25 DIAGNOSIS — M84.362G STRESS FRACTURE OF LEFT TIBIA WITH DELAYED HEALING, SUBSEQUENT ENCOUNTER: ICD-10-CM

## 2024-03-25 PROCEDURE — 99213 OFFICE O/P EST LOW 20 MIN: CPT | Performed by: ORTHOPAEDIC SURGERY

## 2024-03-25 PROCEDURE — 73610 X-RAY EXAM OF ANKLE: CPT

## 2024-03-25 NOTE — PROGRESS NOTES
Assessment:  1. Stress fracture of left tibia with delayed healing, subsequent encounter  XR ankle 3+ vw left        Patient Active Problem List   Diagnosis    Enterocolitis    Obstructive sleep apnea syndrome    Hypertensive urgency    Hypokalemia    Abnormal CT scan    Occipital headache    Stress fracture of left tibia           Plan      Work on allow him to start putting more weight on it and up to weightbearing as tolerated.  I want him to come back we will repeat the x-rays on his ankle/distal tibia to see how much healing I have also instructed him that if he sees any increase in pain he is to go back to the crutches or put less weight on the leg.            Subjective:     Patient ID:    Chief Complaint:Ilya Ortiz 49 y.o. male      HPI    Patient comes in today now with regards to his left lower leg we had a suspicion he had a tibial stress fracture we ordered an MRI of the MRI was positive he is here for routine follow-up to see how much healing he has it has been 6 weeks since the original visit      The following portions of the patient's history were reviewed and updated as appropriate: allergies, current medications, past family history, past social history, past surgical history and problem list.    All organ systems normal    Social History     Socioeconomic History    Marital status: /Civil Union     Spouse name: Not on file    Number of children: Not on file    Years of education: Not on file    Highest education level: Not on file   Occupational History    Not on file   Tobacco Use    Smoking status: Never    Smokeless tobacco: Never   Substance and Sexual Activity    Alcohol use: Yes     Comment: occasionally    Drug use: No    Sexual activity: Not on file   Other Topics Concern    Not on file   Social History Narrative    Not on file     Social Determinants of Health     Financial Resource Strain: Not on file   Food Insecurity: Not on file   Transportation Needs: Not on file   Physical  "Activity: Not on file   Stress: Not on file   Social Connections: Not on file   Intimate Partner Violence: Not on file   Housing Stability: Not on file     Past Medical History:   Diagnosis Date    Asthma     Hypertension      Past Surgical History:   Procedure Laterality Date    THUMB AMPUTATION Left 09/2015     Allergies   Allergen Reactions    Bee Venom Syncope    Pollen Extract Other (See Comments)     Current Outpatient Medications on File Prior to Visit   Medication Sig Dispense Refill    albuterol (VENTOLIN HFA) 90 mcg/act inhaler Inhale 1-2 puffs      amLODIPine (NORVASC) 10 mg tablet Take 1 tablet (10 mg total) by mouth daily 30 tablet 0    ibuprofen (MOTRIN) 600 mg tablet Take 1 tablet (600 mg total) by mouth every 6 (six) hours as needed for mild pain for up to 5 days 20 tablet 0    metoprolol succinate (TOPROL-XL) 50 mg 24 hr tablet Take 50 mg by mouth daily      olmesartan (BENICAR) 40 mg tablet        No current facility-administered medications on file prior to visit.              Objective:        Ortho Exam  Palpable medial prominence on the medial aspect of the distal third of the tibia.      I have personally reviewed pertinent films in PACS.    Portions of the record may have been created with voice recognition software.  Occasional wrong word or \"sound a like\" substitutions may have occurred due to the inherent limitations of voice recognition software.  Read the chart carefully and recognize, using context, where substitutions have occurred.  "

## 2024-04-22 ENCOUNTER — OFFICE VISIT (OUTPATIENT)
Dept: OBGYN CLINIC | Facility: CLINIC | Age: 49
End: 2024-04-22
Payer: COMMERCIAL

## 2024-04-22 ENCOUNTER — APPOINTMENT (OUTPATIENT)
Dept: RADIOLOGY | Facility: AMBULARY SURGERY CENTER | Age: 49
End: 2024-04-22
Attending: ORTHOPAEDIC SURGERY
Payer: COMMERCIAL

## 2024-04-22 VITALS — BODY MASS INDEX: 34.46 KG/M2 | HEIGHT: 73 IN | WEIGHT: 260 LBS

## 2024-04-22 DIAGNOSIS — M84.362G STRESS FRACTURE OF LEFT TIBIA WITH DELAYED HEALING, SUBSEQUENT ENCOUNTER: ICD-10-CM

## 2024-04-22 DIAGNOSIS — M84.362G STRESS FRACTURE OF LEFT TIBIA WITH DELAYED HEALING, SUBSEQUENT ENCOUNTER: Primary | ICD-10-CM

## 2024-04-22 PROCEDURE — 99213 OFFICE O/P EST LOW 20 MIN: CPT | Performed by: ORTHOPAEDIC SURGERY

## 2024-04-22 PROCEDURE — 73590 X-RAY EXAM OF LOWER LEG: CPT

## 2024-04-22 NOTE — LETTER
April 22, 2024     Patient: Ilya Ortiz  YOB: 1975  Date of Visit: 4/22/2024      To Whom it May Concern:    Ilya Ortiz is under my professional care. Ilya was seen in my office on 4/22/2024. Ilya may return to work on 04/24/2024 without restrictions.     If you have any questions or concerns, please don't hesitate to call.         Sincerely,          Osiel Arias DO        CC: No Recipients

## 2024-04-22 NOTE — PROGRESS NOTES
Assessment:  1. Stress fracture of left tibia with delayed healing, subsequent encounter  XR tibia fibula 2 vw left        Patient Active Problem List   Diagnosis    Enterocolitis    Obstructive sleep apnea syndrome    Hypertensive urgency    Hypokalemia    Abnormal CT scan    Occipital headache    Stress fracture of left tibia       Plan:    49 y.o. male  with stress fracture of the left tibia, DOI 01/12/2024     Discussed xray findings of the left tibia   Patient may return to full weight bearing. Patient may return to work without restrictions. Note provided today.   Patient to follow up as needed       The assessment and plan were formulated by Dr. Arias and I assisted in carrying it out.      Subjective:   Patient ID: Ilya Ortiz is a 49 y.o. male .    HPI    Patient presents to the office for follow up of left ankle pain. Patient has been partial weight bearing without increase in pain. Patient reports he is doing well.     The following portions of the patient's history were reviewed and updated as appropriate: allergies, current medications, past family history, past social history, past surgical history and problem list.    Social History     Socioeconomic History    Marital status: /Civil Union     Spouse name: Not on file    Number of children: Not on file    Years of education: Not on file    Highest education level: Not on file   Occupational History    Not on file   Tobacco Use    Smoking status: Never    Smokeless tobacco: Never   Substance and Sexual Activity    Alcohol use: Yes     Comment: occasionally    Drug use: No    Sexual activity: Not on file   Other Topics Concern    Not on file   Social History Narrative    Not on file     Social Determinants of Health     Financial Resource Strain: Not on file   Food Insecurity: Not on file   Transportation Needs: Not on file   Physical Activity: Not on file   Stress: Not on file   Social Connections: Not on file   Intimate Partner Violence: Not  on file   Housing Stability: Not on file     Past Medical History:   Diagnosis Date    Asthma     Hypertension      Past Surgical History:   Procedure Laterality Date    THUMB AMPUTATION Left 09/2015     Allergies   Allergen Reactions    Bee Venom Syncope    Pollen Extract Other (See Comments)     Current Outpatient Medications on File Prior to Visit   Medication Sig Dispense Refill    albuterol (VENTOLIN HFA) 90 mcg/act inhaler Inhale 1-2 puffs      amLODIPine (NORVASC) 10 mg tablet Take 1 tablet (10 mg total) by mouth daily 30 tablet 0    ibuprofen (MOTRIN) 600 mg tablet Take 1 tablet (600 mg total) by mouth every 6 (six) hours as needed for mild pain for up to 5 days 20 tablet 0    metoprolol succinate (TOPROL-XL) 50 mg 24 hr tablet Take 50 mg by mouth daily      olmesartan (BENICAR) 40 mg tablet        No current facility-administered medications on file prior to visit.       Review of Systems   Constitutional:  Negative for chills and fever.   HENT:  Negative for ear pain and sore throat.    Eyes:  Negative for pain and visual disturbance.   Respiratory:  Negative for cough and shortness of breath.    Cardiovascular:  Negative for chest pain and palpitations.   Gastrointestinal:  Negative for abdominal pain and vomiting.   Genitourinary:  Negative for dysuria and hematuria.   Musculoskeletal:  Negative for arthralgias and back pain.   Skin:  Negative for color change and rash.   Neurological:  Negative for seizures and syncope.   All other systems reviewed and are negative.    See HPi    Objective:    There were no vitals filed for this visit.    Physical Exam  Vitals and nursing note reviewed.   Constitutional:       General: He is not in acute distress.     Appearance: He is well-developed.   HENT:      Head: Normocephalic and atraumatic.   Eyes:      Conjunctiva/sclera: Conjunctivae normal.   Cardiovascular:      Rate and Rhythm: Normal rate and regular rhythm.      Heart sounds: No murmur heard.  Pulmonary:  "     Effort: Pulmonary effort is normal. No respiratory distress.      Breath sounds: Normal breath sounds.   Abdominal:      Palpations: Abdomen is soft.      Tenderness: There is no abdominal tenderness.   Musculoskeletal:         General: No swelling.      Cervical back: Neck supple.   Skin:     General: Skin is warm and dry.      Capillary Refill: Capillary refill takes less than 2 seconds.   Neurological:      Mental Status: He is alert.   Psychiatric:         Mood and Affect: Mood normal.         Left Ankle Exam     Tenderness   The patient is experiencing no tenderness.     Range of Motion   Dorsiflexion:  normal   Plantar flexion:  normal   Eversion:  normal   Inversion:  normal           I have personally reviewed pertinent films in PACS and my interpretation is xray left tibia reveals sign of healing of left distal tibia fracture.    Procedures  No Procedures performed today       Portions of the record may have been created with voice recognition software.  Occasional wrong word or \"sound a like\" substitutions may have occurred due to the inherent limitations of voice recognition software.  Read the chart carefully and recognize, using context, where substitutions have occurred.   "

## 2024-10-29 ENCOUNTER — HOSPITAL ENCOUNTER (EMERGENCY)
Facility: HOSPITAL | Age: 49
Discharge: HOME | End: 2024-10-29
Attending: EMERGENCY MEDICINE
Payer: COMMERCIAL

## 2024-10-29 ENCOUNTER — APPOINTMENT (EMERGENCY)
Dept: RADIOLOGY | Facility: HOSPITAL | Age: 49
End: 2024-10-29
Payer: COMMERCIAL

## 2024-10-29 VITALS
RESPIRATION RATE: 20 BRPM | HEIGHT: 73 IN | HEART RATE: 119 BPM | OXYGEN SATURATION: 93 % | TEMPERATURE: 98.1 F | DIASTOLIC BLOOD PRESSURE: 93 MMHG | WEIGHT: 278 LBS | SYSTOLIC BLOOD PRESSURE: 137 MMHG | BODY MASS INDEX: 36.84 KG/M2

## 2024-10-29 DIAGNOSIS — J45.901 ASTHMA EXACERBATION: Primary | ICD-10-CM

## 2024-10-29 LAB
ANION GAP SERPL CALCULATED.3IONS-SCNC: 9 MMOL/L (ref 4–13)
BASOPHILS # BLD AUTO: 0.03 THOUSANDS/ΜL (ref 0–0.1)
BASOPHILS NFR BLD AUTO: 0 % (ref 0–1)
BNP SERPL-MCNC: 8 PG/ML (ref 0–100)
BUN SERPL-MCNC: 21 MG/DL (ref 5–25)
CALCIUM SERPL-MCNC: 9.5 MG/DL (ref 8.4–10.2)
CARDIAC TROPONIN I PNL SERPL HS: 4 NG/L
CHLORIDE SERPL-SCNC: 105 MMOL/L (ref 96–108)
CO2 SERPL-SCNC: 24 MMOL/L (ref 21–32)
CREAT SERPL-MCNC: 1.1 MG/DL (ref 0.6–1.3)
D DIMER PPP FEU-MCNC: <0.27 UG/ML FEU
EOSINOPHIL # BLD AUTO: 0.12 THOUSAND/ΜL (ref 0–0.61)
EOSINOPHIL NFR BLD AUTO: 2 % (ref 0–6)
ERYTHROCYTE [DISTWIDTH] IN BLOOD BY AUTOMATED COUNT: 13 % (ref 11.6–15.1)
FLUAV AG UPPER RESP QL IA.RAPID: NEGATIVE
FLUBV AG UPPER RESP QL IA.RAPID: NEGATIVE
GFR SERPL CREATININE-BSD FRML MDRD: 79 ML/MIN/1.73SQ M
GLUCOSE SERPL-MCNC: 112 MG/DL (ref 65–140)
HCT VFR BLD AUTO: 48.4 % (ref 36.5–49.3)
HGB BLD-MCNC: 16.3 G/DL (ref 12–17)
IMM GRANULOCYTES # BLD AUTO: 0.01 THOUSAND/UL (ref 0–0.2)
IMM GRANULOCYTES NFR BLD AUTO: 0 % (ref 0–2)
LYMPHOCYTES # BLD AUTO: 1.73 THOUSANDS/ΜL (ref 0.6–4.47)
LYMPHOCYTES NFR BLD AUTO: 24 % (ref 14–44)
MCH RBC QN AUTO: 31.2 PG (ref 26.8–34.3)
MCHC RBC AUTO-ENTMCNC: 33.7 G/DL (ref 31.4–37.4)
MCV RBC AUTO: 93 FL (ref 82–98)
MONOCYTES # BLD AUTO: 0.57 THOUSAND/ΜL (ref 0.17–1.22)
MONOCYTES NFR BLD AUTO: 8 % (ref 4–12)
NEUTROPHILS # BLD AUTO: 4.73 THOUSANDS/ΜL (ref 1.85–7.62)
NEUTS SEG NFR BLD AUTO: 66 % (ref 43–75)
NRBC BLD AUTO-RTO: 0 /100 WBCS
PLATELET # BLD AUTO: 206 THOUSANDS/UL (ref 149–390)
PMV BLD AUTO: 11.1 FL (ref 8.9–12.7)
POTASSIUM SERPL-SCNC: 3.7 MMOL/L (ref 3.5–5.3)
RBC # BLD AUTO: 5.22 MILLION/UL (ref 3.88–5.62)
SARS-COV+SARS-COV-2 AG RESP QL IA.RAPID: NEGATIVE
SODIUM SERPL-SCNC: 138 MMOL/L (ref 135–147)
WBC # BLD AUTO: 7.19 THOUSAND/UL (ref 4.31–10.16)

## 2024-10-29 PROCEDURE — 99285 EMERGENCY DEPT VISIT HI MDM: CPT | Performed by: EMERGENCY MEDICINE

## 2024-10-29 PROCEDURE — 71045 X-RAY EXAM CHEST 1 VIEW: CPT

## 2024-10-29 PROCEDURE — 85025 COMPLETE CBC W/AUTO DIFF WBC: CPT | Performed by: EMERGENCY MEDICINE

## 2024-10-29 PROCEDURE — 99285 EMERGENCY DEPT VISIT HI MDM: CPT

## 2024-10-29 PROCEDURE — 36415 COLL VENOUS BLD VENIPUNCTURE: CPT | Performed by: EMERGENCY MEDICINE

## 2024-10-29 PROCEDURE — 87804 INFLUENZA ASSAY W/OPTIC: CPT | Performed by: EMERGENCY MEDICINE

## 2024-10-29 PROCEDURE — 83880 ASSAY OF NATRIURETIC PEPTIDE: CPT | Performed by: EMERGENCY MEDICINE

## 2024-10-29 PROCEDURE — 87811 SARS-COV-2 COVID19 W/OPTIC: CPT | Performed by: EMERGENCY MEDICINE

## 2024-10-29 PROCEDURE — 85379 FIBRIN DEGRADATION QUANT: CPT | Performed by: EMERGENCY MEDICINE

## 2024-10-29 PROCEDURE — 80048 BASIC METABOLIC PNL TOTAL CA: CPT | Performed by: EMERGENCY MEDICINE

## 2024-10-29 PROCEDURE — 93005 ELECTROCARDIOGRAM TRACING: CPT

## 2024-10-29 PROCEDURE — 94640 AIRWAY INHALATION TREATMENT: CPT

## 2024-10-29 PROCEDURE — 84484 ASSAY OF TROPONIN QUANT: CPT | Performed by: EMERGENCY MEDICINE

## 2024-10-29 RX ORDER — ALBUTEROL SULFATE 0.83 MG/ML
SOLUTION RESPIRATORY (INHALATION)
Status: DISCONTINUED
Start: 2024-10-29 | End: 2024-10-29 | Stop reason: HOSPADM

## 2024-10-29 RX ORDER — IPRATROPIUM BROMIDE AND ALBUTEROL SULFATE 2.5; .5 MG/3ML; MG/3ML
3 SOLUTION RESPIRATORY (INHALATION) ONCE
Status: COMPLETED | OUTPATIENT
Start: 2024-10-29 | End: 2024-10-29

## 2024-10-29 RX ORDER — PREDNISONE 20 MG/1
20 TABLET ORAL 2 TIMES DAILY WITH MEALS
Qty: 8 TABLET | Refills: 0 | Status: SHIPPED | OUTPATIENT
Start: 2024-10-29 | End: 2024-11-02

## 2024-10-29 RX ORDER — ALBUTEROL SULFATE 0.83 MG/ML
2.5 SOLUTION RESPIRATORY (INHALATION) EVERY 6 HOURS PRN
Qty: 75 ML | Refills: 0 | Status: SHIPPED | OUTPATIENT
Start: 2024-10-29

## 2024-10-29 RX ORDER — ALBUTEROL SULFATE 0.83 MG/ML
5 SOLUTION RESPIRATORY (INHALATION) ONCE
Status: COMPLETED | OUTPATIENT
Start: 2024-10-29 | End: 2024-10-29

## 2024-10-29 RX ADMIN — ALBUTEROL SULFATE 5 MG: 2.5 SOLUTION RESPIRATORY (INHALATION) at 14:30

## 2024-10-29 RX ADMIN — IPRATROPIUM BROMIDE AND ALBUTEROL SULFATE 3 ML: 2.5; .5 SOLUTION RESPIRATORY (INHALATION) at 13:11

## 2024-10-29 RX ADMIN — PREDNISONE 50 MG: 20 TABLET ORAL at 13:11

## 2024-10-30 LAB
ATRIAL RATE: 122 BPM
P AXIS: 20 DEGREES
PR INTERVAL: 172 MS
QRS AXIS: 22 DEGREES
QRSD INTERVAL: 88 MS
QT INTERVAL: 310 MS
QTC INTERVAL: 441 MS
T WAVE AXIS: 14 DEGREES
VENTRICULAR RATE: 122 BPM

## 2024-10-30 PROCEDURE — 93010 ELECTROCARDIOGRAM REPORT: CPT | Performed by: INTERNAL MEDICINE

## 2024-10-31 NOTE — ED PROVIDER NOTES
Time reflects when diagnosis was documented in both MDM as applicable and the Disposition within this note       Time User Action Codes Description Comment    10/29/2024  2:39 PM Jensen Jack [J45.901] Asthma exacerbation           ED Disposition       ED Disposition   Discharge    Condition   --    Date/Time   Tue Oct 29, 2024  3:46 PM    Comment   Ilya GONZALEZ Pysher discharge to home/self care.                   Assessment & Plan       Medical Decision Making  50 yo M with SOB, very mild wheezing on exam, O2 sats initially in the 93-94% range.  Hx of asthma - otherwise no sig PMHx. Treated w/ duoneb and prednisone w/ improvement. Ddx including asthma, pneumonia, PE, ACS. CXR negative. D-dimer neg (low risk pt) and troponins neg as well.  Stable for d/c home w/ pred burst and albuterol PRN, nebulizer provided. RTEr precautions if symptoms worsen. HEART score 1, low risk.    Amount and/or Complexity of Data Reviewed  Labs: ordered. Decision-making details documented in ED Course.  Radiology: ordered and independent interpretation performed. Decision-making details documented in ED Course.  ECG/medicine tests: ordered and independent interpretation performed. Decision-making details documented in ED Course.    Risk  Prescription drug management.             Medications   ipratropium-albuterol (DUO-NEB) 0.5-2.5 mg/3 mL inhalation solution 3 mL (3 mL Nebulization Given 10/29/24 1311)   predniSONE tablet 50 mg (50 mg Oral Given 10/29/24 1311)   albuterol inhalation solution 5 mg (5 mg Nebulization Given 10/29/24 1430)       ED Risk Strat Scores   HEART Risk Score      Flowsheet Row Most Recent Value   Heart Score Risk Calculator    History 0 Filed at: 10/29/2024 1400   ECG 0 Filed at: 10/29/2024 1400   Age 1 Filed at: 10/29/2024 1400   Risk Factors 0 Filed at: 10/29/2024 1400   Troponin 0 Filed at: 10/29/2024 1400   HEART Score 1 Filed at: 10/29/2024 1400                           PERC Rule for PE      Flowsheet Row  "Most Recent Value   PERC Rule for PE    Age >=50 0 Filed at: 10/29/2024 1253   HR >=100 1 Filed at: 10/29/2024 1253   O2 Sat on room air < 95% 1 Filed at: 10/29/2024 1253   History of PE or DVT 0 Filed at: 10/29/2024 1253   Recent trauma or surgery 0 Filed at: 10/29/2024 1253   Hemoptysis 0 Filed at: 10/29/2024 1253   Exogenous estrogen 0 Filed at: 10/29/2024 1253   Unilateral leg swelling 0 Filed at: 10/29/2024 1253   PERC Rule for PE Results 2 Filed at: 10/29/2024 1253            SBIRT 20yo+      Flowsheet Row Most Recent Value   Initial Alcohol Screen: US AUDIT-C     1. How often do you have a drink containing alcohol? 0 Filed at: 10/29/2024 1231   2. How many drinks containing alcohol do you have on a typical day you are drinking?  0 Filed at: 10/29/2024 1231   3a. Male UNDER 65: How often do you have five or more drinks on one occasion? 0 Filed at: 10/29/2024 1231   Audit-C Score 0 Filed at: 10/29/2024 1231   GANGA: How many times in the past year have you...    Used an illegal drug or used a prescription medication for non-medical reasons? Never Filed at: 10/29/2024 1231            Wells' Criteria for PE      Flowsheet Row Most Recent Value   Wells' Criteria for PE    Clinical signs and symptoms of DVT 0 Filed at: 10/29/2024 1253   PE is primary diagnosis or equally likely 0 Filed at: 10/29/2024 1253   HR >100 1.5 Filed at: 10/29/2024 1253   Immobilization at least 3 days or Surgery in the previous 4 weeks 0 Filed at: 10/29/2024 1253   Previous, objectively diagnosed PE or DVT 0 Filed at: 10/29/2024 1253   Hemoptysis 0 Filed at: 10/29/2024 1253   Malignancy with treatment within 6 months or palliative 0 Filed at: 10/29/2024 1253   Wells' Criteria Total 1.5 Filed at: 10/29/2024 1253                        History of Present Illness       Chief Complaint   Patient presents with    Shortness of Breath     SOB starting this morning with gradual worsening. Denies CP but reports \"tightness\". Pt reports he has asthma. "        Past Medical History:   Diagnosis Date    Asthma     Hypertension       Past Surgical History:   Procedure Laterality Date    THUMB AMPUTATION Left 09/2015      History reviewed. No pertinent family history.   Social History     Tobacco Use    Smoking status: Never    Smokeless tobacco: Never   Substance Use Topics    Alcohol use: Yes     Comment: occasionally    Drug use: No      E-Cigarette/Vaping      E-Cigarette/Vaping Substances      I have reviewed and agree with the history as documented.     50 yo M with history of asthma, starting this morning, gradually worsening.  Nonsmoker, no chest pain (only states tightness), no associated symptoms. Tried his albuterol inhaler twice, with minimal relief, but concerned because he hasn't had an asthma exacerbation in a very long time. No recent travel, no history of VTE.      History provided by:  Patient   used: No    Shortness of Breath  Associated symptoms: cough    Associated symptoms: no fever        Review of Systems   Constitutional:  Negative for chills, fatigue and fever.   Respiratory:  Positive for cough and shortness of breath.    All other systems reviewed and are negative.          Objective       ED Triage Vitals [10/29/24 1215]   Temperature Pulse Blood Pressure Respirations SpO2 Patient Position - Orthostatic VS   98.1 °F (36.7 °C) (!) 122 (!) 172/103 20 94 % Sitting      Temp Source Heart Rate Source BP Location FiO2 (%) Pain Score    Oral Monitor Right arm -- --      Vitals      Date and Time Temp Pulse SpO2 Resp BP Pain Score FACES Pain Rating User   10/29/24 1430 -- 119 93 % -- 137/93 -- -- RIGOBERTO   10/29/24 1400 -- 128 94 % -- 142/90 -- -- RIGOBERTO   10/29/24 1242 -- 116 94 % 20 144/88 -- -- MM   10/29/24 1230 -- 116 93 % 20 186/100 -- -- MM   10/29/24 1215 98.1 °F (36.7 °C) 122 94 % 20 172/103 -- -- KD            Physical Exam  Vitals and nursing note reviewed.   Constitutional:       General: He is not in acute distress.      Appearance: Normal appearance. He is well-developed and normal weight. He is not ill-appearing, toxic-appearing or diaphoretic.   HENT:      Head: Normocephalic and atraumatic.      Right Ear: External ear normal.      Left Ear: External ear normal.      Nose: Nose normal. No congestion or rhinorrhea.      Mouth/Throat:      Mouth: Mucous membranes are moist.      Pharynx: Oropharynx is clear.   Eyes:      General: No scleral icterus.        Right eye: No discharge.         Left eye: No discharge.      Conjunctiva/sclera: Conjunctivae normal.   Cardiovascular:      Rate and Rhythm: Regular rhythm. Tachycardia present.      Pulses: Normal pulses.      Heart sounds: Normal heart sounds.   Pulmonary:      Effort: Pulmonary effort is normal. No respiratory distress.      Breath sounds: Examination of the left-lower field reveals wheezing. Wheezing present. No decreased breath sounds, rhonchi or rales.   Abdominal:      General: Abdomen is flat.      Palpations: Abdomen is soft.      Tenderness: There is no abdominal tenderness.   Musculoskeletal:         General: No swelling. Normal range of motion.      Cervical back: Normal range of motion and neck supple.      Right lower leg: No tenderness. No edema.      Left lower leg: No tenderness. No edema.   Skin:     General: Skin is warm and dry.      Capillary Refill: Capillary refill takes less than 2 seconds.   Neurological:      General: No focal deficit present.      Mental Status: He is alert and oriented to person, place, and time. Mental status is at baseline.   Psychiatric:         Mood and Affect: Mood normal.         Behavior: Behavior normal.         Results Reviewed       Procedure Component Value Units Date/Time    B-Type Natriuretic Peptide(BNP) [[id number]]  (Normal) Collected: 10/29/24 1317    Lab Status: Final result Specimen: Blood from Arm, Left Updated: 10/29/24 1351     BNP 8 pg/mL     HS Troponin 0hr (reflex protocol) [[id number]]  (Normal) Collected:  10/29/24 1317    Lab Status: Final result Specimen: Blood from Arm, Left Updated: 10/29/24 1346     hs TnI 0hr 4 ng/L     D-Dimer [[id number]]  (Normal) Collected: 10/29/24 1317    Lab Status: Final result Specimen: Blood from Arm, Left Updated: 10/29/24 1342     D-Dimer, Quant <0.27 ug/ml FEU     FLU/COVID Rapid Antigen (30 min. TAT) - Preferred screening test in ED [[id number]]  (Normal) Collected: 10/29/24 1317    Lab Status: Final result Specimen: Nares from Nose Updated: 10/29/24 1341     SARS COV Rapid Antigen Negative     Influenza A Rapid Antigen Negative     Influenza B Rapid Antigen Negative    Narrative:      This test has been performed using the [employer] Susanna 2 FLU+SARS Antigen test under the Emergency Use Authorization (EUA). This test has been validated by the  and verified by the performing laboratory. The Susanna uses lateral flow immunofluorescent sandwich assay to detect SARS-COV, Influenza A and Influenza B Antigen.     The [employer] Susanna 2 SARS Antigen test does not differentiate between SARS-CoV and SARS-CoV-2.     Negative results are presumptive and may be confirmed with a molecular assay, if necessary, for patient management. Negative results do not rule out SARS-CoV-2 or influenza infection and should not be used as the sole basis for treatment or patient management decisions. A negative test result may occur if the level of antigen in a sample is below the limit of detection of this test.     Positive results are indicative of the presence of viral antigens, but do not rule out bacterial infection or co-infection with other viruses.     All test results should be used as an adjunct to clinical observations and other information available to the provider.    FOR PEDIATRIC PATIENTS - copy/paste COVID Guidelines URL to browser: [email or web address]    Basic metabolic panel [[id number]] Collected: 10/29/24 1317    Lab Status: Final  result Specimen: Blood from Arm, Left Updated: 10/29/24 1338     Sodium 138 mmol/L      Potassium 3.7 mmol/L      Chloride 105 mmol/L      CO2 24 mmol/L      ANION GAP 9 mmol/L      BUN 21 mg/dL      Creatinine 1.10 mg/dL      Glucose 112 mg/dL      Calcium 9.5 mg/dL      eGFR 79 ml/min/1.73sq m     Narrative:      National Kidney Disease Foundation guidelines for Chronic Kidney Disease (CKD):     Stage 1 with normal or high GFR (GFR > 90 mL/min/1.73 square meters)    Stage 2 Mild CKD (GFR = 60-89 mL/min/1.73 square meters)    Stage 3A Moderate CKD (GFR = 45-59 mL/min/1.73 square meters)    Stage 3B Moderate CKD (GFR = 30-44 mL/min/1.73 square meters)    Stage 4 Severe CKD (GFR = 15-29 mL/min/1.73 square meters)    Stage 5 End Stage CKD (GFR <15 mL/min/1.73 square meters)  Note: GFR calculation is accurate only with a steady state creatinine    CBC and differential [[id number]] Collected: 10/29/24 1317    Lab Status: Final result Specimen: Blood from Arm, Left Updated: 10/29/24 1325     WBC 7.19 Thousand/uL      RBC 5.22 Million/uL      Hemoglobin 16.3 g/dL      Hematocrit 48.4 %      MCV 93 fL      MCH 31.2 pg      MCHC 33.7 g/dL      RDW 13.0 %      MPV 11.1 fL      Platelets 206 Thousands/uL      nRBC 0 /100 WBCs      Segmented % 66 %      Immature Grans % 0 %      Lymphocytes % 24 %      Monocytes % 8 %      Eosinophils Relative 2 %      Basophils Relative 0 %      Absolute Neutrophils 4.73 Thousands/µL      Absolute Immature Grans 0.01 Thousand/uL      Absolute Lymphocytes 1.73 Thousands/µL      Absolute Monocytes 0.57 Thousand/µL      Eosinophils Absolute 0.12 Thousand/µL      Basophils Absolute 0.03 Thousands/µL             XR chest 1 view portable   Final Interpretation by Genesis Padilla MD (10/29 1324)      No acute cardiopulmonary disease.            Workstation performed: [id number]             ECG 12 Lead Documentation Only    Date/Time: 10/29/2024 1:11 PM    Performed by: eJnsen Jack,    Authorized by: Jensen Jack DO    Indications / Diagnosis:  Sob  ECG reviewed by me, the ED Provider: yes    Patient location:  ED  Previous ECG:     Previous ECG:  Compared to current    Similarity:  No change    Comparison to cardiac monitor: Yes    Interpretation:     Interpretation: non-specific    Rate:     ECG rate:  122    ECG rate assessment: tachycardic    Rhythm:     Rhythm: sinus tachycardia    Ectopy:     Ectopy: none    QRS:     QRS axis:  Normal    QRS intervals:  Normal  Conduction:     Conduction: normal    ST segments:     ST segments:  Normal  T waves:     T waves: normal        ED Medication and Procedure Management   Prior to Admission Medications   Prescriptions Last Dose Informant Patient Reported? Taking?   albuterol (VENTOLIN HFA) 90 mcg/act inhaler  Self Yes No   Sig: Inhale 1-2 puffs   amLODIPine (NORVASC) 10 mg tablet  Self No No   Sig: Take 1 tablet (10 mg total) by mouth daily   ibuprofen (MOTRIN) 600 mg tablet  Self No No   Sig: Take 1 tablet (600 mg total) by mouth every 6 (six) hours as needed for mild pain for up to 5 days   metoprolol succinate (TOPROL-XL) 50 mg 24 hr tablet  Self Yes No   Sig: Take 50 mg by mouth daily   olmesartan (BENICAR) 40 mg tablet  Self Yes No      Facility-Administered Medications: None     Discharge Medication List as of 10/29/2024  2:41 PM        START taking these medications    Details   albuterol (2.5 mg/3 mL) 0.083 % nebulizer solution Take 3 mL (2.5 mg total) by nebulization every 6 (six) hours as needed for wheezing or shortness of breath, Starting Tue 10/29/2024, Normal      predniSONE 20 mg tablet Take 1 tablet (20 mg total) by mouth 2 (two) times a day with meals for 4 days, Starting Tue 10/29/2024, Until Sat 11/2/2024, Normal           CONTINUE these medications which have NOT CHANGED    Details   albuterol (VENTOLIN HFA) 90 mcg/act inhaler Inhale 1-2 puffs, Historical Med      amLODIPine (NORVASC) 10 mg tablet Take 1 tablet (10 mg total)  by mouth daily, Starting Sun 3/21/2021, Normal      ibuprofen (MOTRIN) 600 mg tablet Take 1 tablet (600 mg total) by mouth every 6 (six) hours as needed for mild pain for up to 5 days, Starting Thu 11/23/2023, Until Mon 2/26/2024 at 2359, Normal      metoprolol succinate (TOPROL-XL) 50 mg 24 hr tablet Take 50 mg by mouth daily, Historical Med      olmesartan (BENICAR) 40 mg tablet Historical Med           No discharge procedures on file.  ED SEPSIS DOCUMENTATION   Time reflects when diagnosis was documented in both MDM as applicable and the Disposition within this note       Time User Action Codes Description Comment    10/29/2024  2:39 PM Jensen Jack [J45.901] Asthma exacerbation                  Jensen Jack DO  10/31/24 1210

## 2025-07-22 ENCOUNTER — HOSPITAL ENCOUNTER (EMERGENCY)
Facility: HOSPITAL | Age: 50
Discharge: HOME | End: 2025-07-22
Attending: EMERGENCY MEDICINE | Admitting: EMERGENCY MEDICINE
Payer: COMMERCIAL

## 2025-07-22 ENCOUNTER — APPOINTMENT (EMERGENCY)
Dept: RADIOLOGY | Facility: HOSPITAL | Age: 50
End: 2025-07-22
Payer: COMMERCIAL

## 2025-07-22 VITALS
HEART RATE: 111 BPM | SYSTOLIC BLOOD PRESSURE: 150 MMHG | OXYGEN SATURATION: 94 % | TEMPERATURE: 97.7 F | RESPIRATION RATE: 17 BRPM | DIASTOLIC BLOOD PRESSURE: 81 MMHG

## 2025-07-22 DIAGNOSIS — R00.2 PALPITATIONS: ICD-10-CM

## 2025-07-22 DIAGNOSIS — R74.01 TRANSAMINITIS: Primary | ICD-10-CM

## 2025-07-22 DIAGNOSIS — R03.0 ELEVATED BLOOD PRESSURE READING: ICD-10-CM

## 2025-07-22 LAB
2HR DELTA HS TROPONIN: -1 NG/L
ALBUMIN SERPL BCG-MCNC: 5 G/DL (ref 3.5–5)
ALP SERPL-CCNC: 68 U/L (ref 34–104)
ALT SERPL W P-5'-P-CCNC: 117 U/L (ref 7–52)
ANION GAP SERPL CALCULATED.3IONS-SCNC: 16 MMOL/L (ref 4–13)
AST SERPL W P-5'-P-CCNC: 65 U/L (ref 13–39)
ATRIAL RATE: 109 BPM
BASOPHILS # BLD AUTO: 0.04 THOUSANDS/ÂΜL (ref 0–0.1)
BASOPHILS NFR BLD AUTO: 1 % (ref 0–1)
BILIRUB SERPL-MCNC: 0.75 MG/DL (ref 0.2–1)
BUN SERPL-MCNC: 18 MG/DL (ref 5–25)
CALCIUM SERPL-MCNC: 9.5 MG/DL (ref 8.4–10.2)
CARDIAC TROPONIN I PNL SERPL HS: 7 NG/L (ref ?–50)
CARDIAC TROPONIN I PNL SERPL HS: 8 NG/L (ref ?–50)
CHLORIDE SERPL-SCNC: 102 MMOL/L (ref 96–108)
CO2 SERPL-SCNC: 20 MMOL/L (ref 21–32)
CREAT SERPL-MCNC: 0.96 MG/DL (ref 0.6–1.3)
D DIMER PPP FEU-MCNC: <0.27 UG/ML FEU
EOSINOPHIL # BLD AUTO: 0.15 THOUSAND/ÂΜL (ref 0–0.61)
EOSINOPHIL NFR BLD AUTO: 3 % (ref 0–6)
ERYTHROCYTE [DISTWIDTH] IN BLOOD BY AUTOMATED COUNT: 12.7 % (ref 11.6–15.1)
GFR SERPL CREATININE-BSD FRML MDRD: 93 ML/MIN/1.73SQ M
GLUCOSE SERPL-MCNC: 98 MG/DL (ref 65–140)
HCT VFR BLD AUTO: 48.1 % (ref 36.5–49.3)
HGB BLD-MCNC: 16.4 G/DL (ref 12–17)
IMM GRANULOCYTES # BLD AUTO: 0.03 THOUSAND/UL (ref 0–0.2)
IMM GRANULOCYTES NFR BLD AUTO: 1 % (ref 0–2)
LYMPHOCYTES # BLD AUTO: 1.26 THOUSANDS/ÂΜL (ref 0.6–4.47)
LYMPHOCYTES NFR BLD AUTO: 22 % (ref 14–44)
MCH RBC QN AUTO: 31.9 PG (ref 26.8–34.3)
MCHC RBC AUTO-ENTMCNC: 34.1 G/DL (ref 31.4–37.4)
MCV RBC AUTO: 94 FL (ref 82–98)
MONOCYTES # BLD AUTO: 0.45 THOUSAND/ÂΜL (ref 0.17–1.22)
MONOCYTES NFR BLD AUTO: 8 % (ref 4–12)
NEUTROPHILS # BLD AUTO: 3.7 THOUSANDS/ÂΜL (ref 1.85–7.62)
NEUTS SEG NFR BLD AUTO: 65 % (ref 43–75)
NRBC BLD AUTO-RTO: 0 /100 WBCS
P AXIS: 32 DEGREES
PLATELET # BLD AUTO: 168 THOUSANDS/UL (ref 149–390)
PMV BLD AUTO: 10.7 FL (ref 8.9–12.7)
POTASSIUM SERPL-SCNC: 3.8 MMOL/L (ref 3.5–5.3)
PR INTERVAL: 188 MS
PROT SERPL-MCNC: 8.3 G/DL (ref 6.4–8.4)
QRS AXIS: 3 DEGREES
QRSD INTERVAL: 86 MS
QT INTERVAL: 342 MS
QTC INTERVAL: 460 MS
RBC # BLD AUTO: 5.14 MILLION/UL (ref 3.88–5.62)
SODIUM SERPL-SCNC: 138 MMOL/L (ref 135–147)
T WAVE AXIS: 43 DEGREES
TSH SERPL DL<=0.05 MIU/L-ACNC: 2.89 UIU/ML (ref 0.45–4.5)
VENTRICULAR RATE: 109 BPM
WBC # BLD AUTO: 5.63 THOUSAND/UL (ref 4.31–10.16)

## 2025-07-22 PROCEDURE — 80053 COMPREHEN METABOLIC PANEL: CPT

## 2025-07-22 PROCEDURE — 84484 ASSAY OF TROPONIN QUANT: CPT

## 2025-07-22 PROCEDURE — 85025 COMPLETE CBC W/AUTO DIFF WBC: CPT

## 2025-07-22 PROCEDURE — 93005 ELECTROCARDIOGRAM TRACING: CPT

## 2025-07-22 PROCEDURE — 85379 FIBRIN DEGRADATION QUANT: CPT

## 2025-07-22 PROCEDURE — 96361 HYDRATE IV INFUSION ADD-ON: CPT

## 2025-07-22 PROCEDURE — 93010 ELECTROCARDIOGRAM REPORT: CPT | Performed by: INTERNAL MEDICINE

## 2025-07-22 PROCEDURE — 84443 ASSAY THYROID STIM HORMONE: CPT

## 2025-07-22 PROCEDURE — 71046 X-RAY EXAM CHEST 2 VIEWS: CPT

## 2025-07-22 PROCEDURE — 36415 COLL VENOUS BLD VENIPUNCTURE: CPT

## 2025-07-22 PROCEDURE — 99285 EMERGENCY DEPT VISIT HI MDM: CPT | Performed by: EMERGENCY MEDICINE

## 2025-07-22 PROCEDURE — 96374 THER/PROPH/DIAG INJ IV PUSH: CPT

## 2025-07-22 PROCEDURE — 99285 EMERGENCY DEPT VISIT HI MDM: CPT

## 2025-07-22 RX ORDER — ONDANSETRON 2 MG/ML
4 INJECTION INTRAMUSCULAR; INTRAVENOUS ONCE
Status: COMPLETED | OUTPATIENT
Start: 2025-07-22 | End: 2025-07-22

## 2025-07-22 RX ADMIN — ONDANSETRON 4 MG: 2 INJECTION, SOLUTION INTRAMUSCULAR; INTRAVENOUS at 04:00

## 2025-07-22 RX ADMIN — SODIUM CHLORIDE 500 ML: 0.9 INJECTION, SOLUTION INTRAVENOUS at 04:39

## 2025-07-22 NOTE — DISCHARGE INSTRUCTIONS
Please follow up with your primary care doctor and cardiology regarding your elevated blood pressure readings and palpitations.

## 2025-07-22 NOTE — ED ATTENDING ATTESTATION
7/22/2025  I, Naresh Araya MD, saw and evaluated the patient. I have discussed the patient with the resident/non-physician practitioner and agree with the resident's/non-physician practitioner's findings, Plan of Care, and MDM as documented in the resident's/non-physician practitioner's note, except where noted. All available labs and Radiology studies were reviewed.  I was present for key portions of any procedure(s) performed by the resident/non-physician practitioner and I was immediately available to provide assistance.       At this point I agree with the current assessment done in the Emergency Department.  I have conducted an independent evaluation of this patient a history and physical is as follows: Patient is a 50 year old male with palpitations tonight with nausea. Started keto diet last week. Has been on keto diet in the past without these sx. Was in [place] earlier this month. No chest pain or sob. No fever. No cough. No N/v/D. No GI bleeding. No excess caffeine use. No cocaine use. No energy drink use. Was last seen at [facility] on 5/30/25 for umbilical hernia. [facility] website checked on this patient and last Rx filled was on 6/12/24 for ultracet for 2 day supply. NCAT. No scleral icterus. Moist mucous membranes. Lungs clear. Tachycardia without murmur. Abdomen soft and nontender. Good bowel sounds. No edema. No rash. No pallor. DDx including but not limited to: metabolic abnormality, cardiac arrhythmia, ACS, MI,  thyroid disease, PE, anxiety, adverse reaction. Doubt intracranial etiology or acute surgical intraabdominal process. Will check labs, EKG, CXR.     ED Course         Critical Care Time  Procedures

## 2025-07-22 NOTE — ED PROVIDER NOTES
Time reflects when diagnosis was documented in both MDM as applicable and the Disposition within this note       Time User Action Codes Description Comment    7/22/2025  7:06 AM Niik Cardenas Add [R74.01] Transaminitis     7/22/2025  7:07 AM Niki Cardenas Add [R03.0] Elevated blood pressure reading     7/22/2025  7:07 AM Niki Cardenas Add [R00.2] Palpitations           ED Disposition       ED Disposition   Discharge    Condition   Stable    Date/Time   Tue Jul 22, 2025  7:06 AM    Comment   Ilya Ortiz discharge to home/self care.                   Assessment & Plan       Medical Decision Making  50-year-old male presents emerged part for evaluation of palpitations and elevated blood pressure reading at home.    Differential diagnosis includes but is not limited to ACS, PE, arrhythmia, electrolyte abnormality, hypertensive emergency, other acute metabolic process.    ED course as below    Amount and/or Complexity of Data Reviewed  Labs: ordered. Decision-making details documented in ED Course.  Radiology: ordered and independent interpretation performed.    Risk  Prescription drug management.        ED Course as of 07/22/25 0727   Tue Jul 22, 2025   0415 CBC and differential  No leukocytosis.  Hemoglobin stable.  Platelets within normal limits.   0724 Comprehensive metabolic panel(!)  Electrolytes within normal limits.  Mild transaminitis noted.  Patient without abdominal pain.   0724 D-Dimer, Quant: <0.27  Negative   0725 TSH 3RD GENERATON: 2.890   0725 hs TnI 0hr: 8   0725 Troponin negative x 3.  Patient without chest pain at this time.  Discussed with patient results negative workup and necessity for cardiology evaluation as well as evaluation by his PCP for his elevated blood pressure and transaminitis.  Patient was aware and agreeable.  All questions answered.  Patient discharged in stable condition.   0727 ECG-sinus tachycardia, rate 109, normal axis, normal QRS, no ST/T-segment changes suggestive of  acute ischemia       Medications   ondansetron (ZOFRAN) injection 4 mg (4 mg Intravenous Given 7/22/25 0400)   sodium chloride 0.9 % bolus 500 mL (0 mL Intravenous Stopped 7/22/25 0700)       ED Risk Strat Scores                    No data recorded    PERC Rule for PE      Flowsheet Row Most Recent Value   PERC Rule for PE    Age >=50 0 Filed at: 07/22/2025 0726   HR >=100 1 Filed at: 07/22/2025 0726   O2 Sat on room air < 95% 0 Filed at: 07/22/2025 0726   History of PE or DVT 0 Filed at: 07/22/2025 0726   Recent trauma or surgery 0 Filed at: 07/22/2025 0726   Hemoptysis 0 Filed at: 07/22/2025 0726   Exogenous estrogen 0 Filed at: 07/22/2025 0726   Unilateral leg swelling 0 Filed at: 07/22/2025 0726   PERC Rule for PE Results 1 Filed at: 07/22/2025 0726            SBIRT 22yo+      Flowsheet Row Most Recent Value   Initial Alcohol Screen: US AUDIT-C     1. How often do you have a drink containing alcohol? 1 Filed at: 07/22/2025 0322   2. How many drinks containing alcohol do you have on a typical day you are drinking?  0 Filed at: 07/22/2025 0322   3a. Male UNDER 65: How often do you have five or more drinks on one occasion? 1 Filed at: 07/22/2025 0322   Audit-C Score 2 Filed at: 07/22/2025 0322   GANGA: How many times in the past year have you...    Used an illegal drug or used a prescription medication for non-medical reasons? Never Filed at: 07/22/2025 0322            Wells' Criteria for PE      Flowsheet Row Most Recent Value   Wells' Criteria for PE    Clinical signs and symptoms of DVT 0 Filed at: 07/22/2025 0726   PE is primary diagnosis or equally likely 0 Filed at: 07/22/2025 0726   HR >100 1.5 Filed at: 07/22/2025 0726   Immobilization at least 3 days or Surgery in the previous 4 weeks 0 Filed at: 07/22/2025 0726   Previous, objectively diagnosed PE or DVT 0 Filed at: 07/22/2025 0726   Hemoptysis 0 Filed at: 07/22/2025 0726   Malignancy with treatment within 6 months or palliative 0 Filed at: 07/22/2025 0726  "  Wells' Criteria Total 1.5 Filed at: 07/22/2025 3053                        History of Present Illness       Chief Complaint   Patient presents with    Rapid Heart Rate    Hypertension     Pt woke up out of sleep feeling weird, took blood pressure was 170/104. Took sartan medication. Felt heart beating fast and checked and it was 118. No SOB, nauseous at times, -HA. Recently started keto diet, thought it might be related? -dizziness       Past Medical History[1]   Past Surgical History[2]   Family History[3]   Social History[4]   E-Cigarette/Vaping      E-Cigarette/Vaping Substances      I have reviewed and agree with the history as documented.     Patient is a 42-year-old male presents emergency due to waking up feeling \"jittery\" at approximately 1:30 AM.  Patient took his blood pressure at time and noted it was 170s systolic.  Also reports palpitations but denies any chest pain, shortness of breath, nausea, vomiting, fevers, chills, Arturo pain, constipation, diarrhea, or other recent illness.  No personal history of arrhythmia.      Rapid Heart Rate  Hypertension  Associated symptoms: palpitations        Review of Systems   Cardiovascular:  Positive for palpitations.   All other systems reviewed and are negative.          Objective       ED Triage Vitals [07/22/25 0323]   Temperature Pulse Blood Pressure Respirations SpO2 Patient Position - Orthostatic VS   97.7 °F (36.5 °C) (!) 114 167/93 18 96 % Sitting      Temp Source Heart Rate Source BP Location FiO2 (%) Pain Score    Oral Monitor Right arm -- No Pain      Vitals      Date and Time Temp Pulse SpO2 Resp BP Pain Score FACES Pain Rating User   07/22/25 0700 -- 111 94 % 17 150/81 -- -- TA   07/22/25 0500 -- 108 96 % 17 161/89 -- -- DB   07/22/25 0430 -- 118 95 % 16 159/91 -- -- DB   07/22/25 0323 97.7 °F (36.5 °C) 114 96 % 18 167/93 No Pain -- MM            Physical Exam  Vitals and nursing note reviewed.   Constitutional:       General: He is not in acute " distress.     Appearance: Normal appearance.   HENT:      Head: Normocephalic and atraumatic.     Eyes:      Extraocular Movements: Extraocular movements intact.      Conjunctiva/sclera: Conjunctivae normal.       Cardiovascular:      Rate and Rhythm: Regular rhythm. Tachycardia present.      Pulses: Normal pulses.      Heart sounds: Normal heart sounds. No murmur heard.  Pulmonary:      Effort: Pulmonary effort is normal. No respiratory distress.      Breath sounds: Normal breath sounds.   Abdominal:      General: There is no distension.      Palpations: Abdomen is soft.      Tenderness: There is no abdominal tenderness. There is no guarding or rebound.     Musculoskeletal:         General: No tenderness. Normal range of motion.      Cervical back: Normal range of motion.      Right lower leg: No edema.      Left lower leg: No edema.     Skin:     General: Skin is warm and dry.      Capillary Refill: Capillary refill takes less than 2 seconds.      Findings: No rash.     Neurological:      General: No focal deficit present.      Mental Status: He is alert and oriented to person, place, and time.      Sensory: No sensory deficit.     Psychiatric:         Mood and Affect: Mood normal.         Behavior: Behavior normal.         Results Reviewed       Procedure Component Value Units Date/Time    HS Troponin I 2hr [[id number]]  (Normal) Collected: 07/22/25 0550    Lab Status: Final result Specimen: Blood from Arm, Left Updated: 07/22/25 0630     hs TnI 2hr 7 ng/L      Delta 2hr hsTnI -1 ng/L     TSH [[id number]]  (Normal) Collected: 07/22/25 0348    Lab Status: Final result Specimen: Blood from Arm, Left Updated: 07/22/25 0440     TSH 3RD GENERATION 2.890 uIU/mL     HS Troponin 0hr (reflex protocol) [[id number]]  (Normal) Collected: 07/22/25 0348    Lab Status: Final result Specimen: Blood from Arm, Left Updated: 07/22/25 0433     hs TnI 0hr 8 ng/L     Comprehensive metabolic panel [[id number]]  (Abnormal) Collected:  07/22/25 0348    Lab Status: Final result Specimen: Blood from Arm, Left Updated: 07/22/25 0428     Sodium 138 mmol/L      Potassium 3.8 mmol/L      Chloride 102 mmol/L      CO2 20 mmol/L      ANION GAP 16 mmol/L      BUN 18 mg/dL      Creatinine 0.96 mg/dL      Glucose 98 mg/dL      Calcium 9.5 mg/dL      AST 65 U/L       U/L      Alkaline Phosphatase 68 U/L      Total Protein 8.3 g/dL      Albumin 5.0 g/dL      Total Bilirubin 0.75 mg/dL      eGFR 93 ml/min/1.73sq m     Narrative:      [employer] guidelines for Chronic Kidney Disease (CKD):     Stage 1 with normal or high GFR (GFR > 90 mL/min/1.73 square meters)    Stage 2 Mild CKD (GFR = 60-89 mL/min/1.73 square meters)    Stage 3A Moderate CKD (GFR = 45-59 mL/min/1.73 square meters)    Stage 3B Moderate CKD (GFR = 30-44 mL/min/1.73 square meters)    Stage 4 Severe CKD (GFR = 15-29 mL/min/1.73 square meters)    Stage 5 End Stage CKD (GFR <15 mL/min/1.73 square meters)  Note: GFR calculation is accurate only with a steady state creatinine    D-dimer, quantitative [[id number]]  (Normal) Collected: 07/22/25 0348    Lab Status: Final result Specimen: Blood from Arm, Left Updated: 07/22/25 0423     D-Dimer, Quant <0.27 ug/ml FEU     CBC and differential [[id number]] Collected: 07/22/25 0348    Lab Status: Final result Specimen: Blood from Arm, Left Updated: 07/22/25 0358     WBC 5.63 Thousand/uL      RBC 5.14 Million/uL      Hemoglobin 16.4 g/dL      Hematocrit 48.1 %      MCV 94 fL      MCH 31.9 pg      MCHC 34.1 g/dL      RDW 12.7 %      MPV 10.7 fL      Platelets 168 Thousands/uL      nRBC 0 /100 WBCs      Segmented % 65 %      Immature Grans % 1 %      Lymphocytes % 22 %      Monocytes % 8 %      Eosinophils Relative 3 %      Basophils Relative 1 %      Absolute Neutrophils 3.70 Thousands/µL      Absolute Immature Grans 0.03 Thousand/uL      Absolute Lymphocytes 1.26 Thousands/µL      Absolute Monocytes 0.45 Thousand/µL       Eosinophils Absolute 0.15 Thousand/µL      Basophils Absolute 0.04 Thousands/µL             XR chest 2 views   ED Interpretation by Niki Cardenas MD (07/22 0600)   Wet read: No acute cardiopulmonary process appreciated          Procedures    ED Medication and Procedure Management   Prior to Admission Medications   Prescriptions Last Dose Informant Patient Reported? Taking?   albuterol (2.5 mg/3 mL) 0.083 % nebulizer solution   No No   Sig: Take 3 mL (2.5 mg total) by nebulization every 6 (six) hours as needed for wheezing or shortness of breath   albuterol (VENTOLIN HFA) 90 mcg/act inhaler  Self Yes No   Sig: Inhale 1-2 puffs   amLODIPine (NORVASC) 10 mg tablet  Self No No   Sig: Take 1 tablet (10 mg total) by mouth daily   ibuprofen (MOTRIN) 600 mg tablet  Self No No   Sig: Take 1 tablet (600 mg total) by mouth every 6 (six) hours as needed for mild pain for up to 5 days   metoprolol succinate (TOPROL-XL) 50 mg 24 hr tablet  Self Yes No   Sig: Take 50 mg by mouth daily   olmesartan (BENICAR) 40 mg tablet  Self Yes No      Facility-Administered Medications: None     Discharge Medication List as of 7/22/2025  7:08 AM        CONTINUE these medications which have NOT CHANGED    Details   albuterol (2.5 mg/3 mL) 0.083 % nebulizer solution Take 3 mL (2.5 mg total) by nebulization every 6 (six) hours as needed for wheezing or shortness of breath, Starting Tue 10/29/2024, Normal      albuterol (VENTOLIN HFA) 90 mcg/act inhaler Inhale 1-2 puffs, Historical Med      amLODIPine (NORVASC) 10 mg tablet Take 1 tablet (10 mg total) by mouth daily, Starting Sun 3/21/2021, Normal      ibuprofen (MOTRIN) 600 mg tablet Take 1 tablet (600 mg total) by mouth every 6 (six) hours as needed for mild pain for up to 5 days, Starting Thu 11/23/2023, Until Mon 2/26/2024 at 2359, Normal      metoprolol succinate (TOPROL-XL) 50 mg 24 hr tablet Take 50 mg by mouth daily, Historical Med      olmesartan (BENICAR) 40 mg tablet Historical Med              ED SEPSIS DOCUMENTATION   Time reflects when diagnosis was documented in both MDM as applicable and the Disposition within this note       Time User Action Codes Description Comment    7/22/2025  7:06 AM Niki Cardenas Add [R74.01] Transaminitis     7/22/2025  7:07 AM Niki Cardenas Add [R03.0] Elevated blood pressure reading     7/22/2025  7:07 AM Niki Cardenas Add [R00.2] Palpitations                      Niki Cardenas MD  07/22/25 0726       [1]   Past Medical History:  Diagnosis Date    Asthma     Hypertension    [2]   Past Surgical History:  Procedure Laterality Date    THUMB AMPUTATION Left 09/2015   [3] No family history on file.  [4]   Social History  Tobacco Use    Smoking status: Never    Smokeless tobacco: Never   Substance Use Topics    Alcohol use: Yes     Comment: occasionally    Drug use: No        Niki Cardenas MD  07/22/25 0724